# Patient Record
Sex: FEMALE | Race: WHITE | NOT HISPANIC OR LATINO | Employment: OTHER | URBAN - METROPOLITAN AREA
[De-identification: names, ages, dates, MRNs, and addresses within clinical notes are randomized per-mention and may not be internally consistent; named-entity substitution may affect disease eponyms.]

---

## 2024-05-13 ENCOUNTER — APPOINTMENT (OUTPATIENT)
Dept: LAB | Age: 64
End: 2024-05-13
Payer: COMMERCIAL

## 2024-05-13 ENCOUNTER — OFFICE VISIT (OUTPATIENT)
Dept: FAMILY MEDICINE CLINIC | Facility: CLINIC | Age: 64
End: 2024-05-13
Payer: COMMERCIAL

## 2024-05-13 ENCOUNTER — APPOINTMENT (OUTPATIENT)
Dept: RADIOLOGY | Age: 64
End: 2024-05-13
Payer: COMMERCIAL

## 2024-05-13 ENCOUNTER — TELEPHONE (OUTPATIENT)
Age: 64
End: 2024-05-13

## 2024-05-13 VITALS
SYSTOLIC BLOOD PRESSURE: 132 MMHG | DIASTOLIC BLOOD PRESSURE: 82 MMHG | TEMPERATURE: 98.2 F | HEART RATE: 62 BPM | RESPIRATION RATE: 14 BRPM | WEIGHT: 165 LBS | OXYGEN SATURATION: 98 %

## 2024-05-13 DIAGNOSIS — R73.09 ELEVATED HEMOGLOBIN A1C: ICD-10-CM

## 2024-05-13 DIAGNOSIS — Z12.31 SCREENING MAMMOGRAM FOR BREAST CANCER: ICD-10-CM

## 2024-05-13 DIAGNOSIS — R06.02 SOB (SHORTNESS OF BREATH): ICD-10-CM

## 2024-05-13 DIAGNOSIS — K21.9 GASTROESOPHAGEAL REFLUX DISEASE WITHOUT ESOPHAGITIS: ICD-10-CM

## 2024-05-13 DIAGNOSIS — E55.9 VITAMIN D DEFICIENCY: Primary | ICD-10-CM

## 2024-05-13 DIAGNOSIS — E78.49 OTHER HYPERLIPIDEMIA: ICD-10-CM

## 2024-05-13 DIAGNOSIS — R01.1 HEART MURMUR: ICD-10-CM

## 2024-05-13 DIAGNOSIS — R35.0 URINARY FREQUENCY: ICD-10-CM

## 2024-05-13 DIAGNOSIS — Z12.11 SCREEN FOR COLON CANCER: ICD-10-CM

## 2024-05-13 DIAGNOSIS — M54.16 LUMBAR RADICULOPATHY: ICD-10-CM

## 2024-05-13 DIAGNOSIS — I10 PRIMARY HYPERTENSION: Primary | ICD-10-CM

## 2024-05-13 DIAGNOSIS — E04.1 THYROID NODULE: ICD-10-CM

## 2024-05-13 DIAGNOSIS — I10 PRIMARY HYPERTENSION: ICD-10-CM

## 2024-05-13 DIAGNOSIS — F17.210 CIGARETTE SMOKER: ICD-10-CM

## 2024-05-13 LAB
25(OH)D3 SERPL-MCNC: 21.1 NG/ML (ref 30–100)
BASOPHILS # BLD AUTO: 0.07 THOUSANDS/ÂΜL (ref 0–0.1)
BASOPHILS NFR BLD AUTO: 1 % (ref 0–1)
BILIRUB UR QL STRIP: NEGATIVE
CHOLEST SERPL-MCNC: 143 MG/DL
CLARITY UR: CLEAR
COLOR UR: NORMAL
EOSINOPHIL # BLD AUTO: 0.16 THOUSAND/ÂΜL (ref 0–0.61)
EOSINOPHIL NFR BLD AUTO: 3 % (ref 0–6)
ERYTHROCYTE [DISTWIDTH] IN BLOOD BY AUTOMATED COUNT: 12.5 % (ref 11.6–15.1)
FERRITIN SERPL-MCNC: 45 NG/ML (ref 11–307)
GLUCOSE UR STRIP-MCNC: NEGATIVE MG/DL
HCT VFR BLD AUTO: 38.7 % (ref 34.8–46.1)
HDLC SERPL-MCNC: 50 MG/DL
HGB BLD-MCNC: 12.2 G/DL (ref 11.5–15.4)
HGB UR QL STRIP.AUTO: NEGATIVE
IMM GRANULOCYTES # BLD AUTO: 0.01 THOUSAND/UL (ref 0–0.2)
IMM GRANULOCYTES NFR BLD AUTO: 0 % (ref 0–2)
KETONES UR STRIP-MCNC: NEGATIVE MG/DL
LDLC SERPL CALC-MCNC: 71 MG/DL (ref 0–100)
LEUKOCYTE ESTERASE UR QL STRIP: NEGATIVE
LYMPHOCYTES # BLD AUTO: 2.35 THOUSANDS/ÂΜL (ref 0.6–4.47)
LYMPHOCYTES NFR BLD AUTO: 38 % (ref 14–44)
MAGNESIUM SERPL-MCNC: 2.1 MG/DL (ref 1.9–2.7)
MCH RBC QN AUTO: 30.8 PG (ref 26.8–34.3)
MCHC RBC AUTO-ENTMCNC: 31.5 G/DL (ref 31.4–37.4)
MCV RBC AUTO: 98 FL (ref 82–98)
MONOCYTES # BLD AUTO: 0.48 THOUSAND/ÂΜL (ref 0.17–1.22)
MONOCYTES NFR BLD AUTO: 8 % (ref 4–12)
NEUTROPHILS # BLD AUTO: 3.15 THOUSANDS/ÂΜL (ref 1.85–7.62)
NEUTS SEG NFR BLD AUTO: 50 % (ref 43–75)
NITRITE UR QL STRIP: NEGATIVE
NONHDLC SERPL-MCNC: 93 MG/DL
NRBC BLD AUTO-RTO: 0 /100 WBCS
PH UR STRIP.AUTO: 5.5 [PH]
PLATELET # BLD AUTO: 201 THOUSANDS/UL (ref 149–390)
PMV BLD AUTO: 11.8 FL (ref 8.9–12.7)
PROT UR STRIP-MCNC: NEGATIVE MG/DL
RBC # BLD AUTO: 3.96 MILLION/UL (ref 3.81–5.12)
SL AMB POCT HEMOGLOBIN AIC: 5.5 (ref ?–6.5)
SP GR UR STRIP.AUTO: 1.01 (ref 1–1.03)
T4 FREE SERPL-MCNC: 0.72 NG/DL (ref 0.61–1.12)
TRIGL SERPL-MCNC: 110 MG/DL
TSH SERPL DL<=0.05 MIU/L-ACNC: 2.38 UIU/ML (ref 0.45–4.5)
UROBILINOGEN UR STRIP-ACNC: <2 MG/DL
WBC # BLD AUTO: 6.22 THOUSAND/UL (ref 4.31–10.16)

## 2024-05-13 PROCEDURE — 72110 X-RAY EXAM L-2 SPINE 4/>VWS: CPT

## 2024-05-13 PROCEDURE — 81003 URINALYSIS AUTO W/O SCOPE: CPT

## 2024-05-13 PROCEDURE — 82306 VITAMIN D 25 HYDROXY: CPT

## 2024-05-13 PROCEDURE — 99204 OFFICE O/P NEW MOD 45 MIN: CPT | Performed by: INTERNAL MEDICINE

## 2024-05-13 PROCEDURE — 84439 ASSAY OF FREE THYROXINE: CPT

## 2024-05-13 PROCEDURE — 82728 ASSAY OF FERRITIN: CPT

## 2024-05-13 PROCEDURE — 85025 COMPLETE CBC W/AUTO DIFF WBC: CPT

## 2024-05-13 PROCEDURE — 84443 ASSAY THYROID STIM HORMONE: CPT

## 2024-05-13 PROCEDURE — 36415 COLL VENOUS BLD VENIPUNCTURE: CPT

## 2024-05-13 PROCEDURE — 93000 ELECTROCARDIOGRAM COMPLETE: CPT | Performed by: INTERNAL MEDICINE

## 2024-05-13 PROCEDURE — 83036 HEMOGLOBIN GLYCOSYLATED A1C: CPT | Performed by: INTERNAL MEDICINE

## 2024-05-13 PROCEDURE — 86800 THYROGLOBULIN ANTIBODY: CPT

## 2024-05-13 PROCEDURE — 83735 ASSAY OF MAGNESIUM: CPT

## 2024-05-13 PROCEDURE — 86376 MICROSOMAL ANTIBODY EACH: CPT

## 2024-05-13 PROCEDURE — 80061 LIPID PANEL: CPT

## 2024-05-13 RX ORDER — VALSARTAN 80 MG/1
80 TABLET ORAL DAILY
Qty: 90 TABLET | Refills: 3 | Status: SHIPPED | OUTPATIENT
Start: 2024-05-13

## 2024-05-13 RX ORDER — GABAPENTIN 100 MG/1
100 CAPSULE ORAL 2 TIMES DAILY
Qty: 60 CAPSULE | Refills: 1 | Status: SHIPPED | OUTPATIENT
Start: 2024-05-13

## 2024-05-13 RX ORDER — ERGOCALCIFEROL 1.25 MG/1
50000 CAPSULE ORAL WEEKLY
Qty: 12 CAPSULE | Refills: 2 | Status: SHIPPED | OUTPATIENT
Start: 2024-05-13

## 2024-05-13 RX ORDER — ROSUVASTATIN CALCIUM 5 MG/1
5 TABLET, COATED ORAL DAILY
Qty: 90 TABLET | Refills: 3 | Status: SHIPPED | OUTPATIENT
Start: 2024-05-13

## 2024-05-13 RX ORDER — BISOPROLOL FUMARATE 5 MG/1
5 TABLET, FILM COATED ORAL DAILY
Qty: 90 TABLET | Refills: 3 | Status: SHIPPED | OUTPATIENT
Start: 2024-05-13

## 2024-05-13 NOTE — TELEPHONE ENCOUNTER
Caller: Patient     Doctor/Office: N/A     Call regarding :  referral      Call was transferred to: [facility]

## 2024-05-13 NOTE — PROGRESS NOTES
Name: Young Quiñonez      : 1960      MRN: [id number]  Encounter Provider: Parveen Sosa MD  Encounter Date: 2024   Encounter department: [facility] CARE [facility]    Assessment & Plan     1. Primary hypertension  -     POCT hemoglobin A1c  -     Echo complete w/ contrast if indicated; Future; Expected date: 2024  -     Vitamin D 25 hydroxy; Future; Expected date: 2024  -     Magnesium; Future  -     CBC and differential; Future; Expected date: 2024  -     Comprehensive metabolic panel; Future; Expected date: 2024  -     Lipid panel; Future; Expected date: 2024  -     valsartan (DIOVAN) 80 mg tablet; Take 1 tablet (80 mg total) by mouth daily  -     bisoprolol (ZEBETA) 5 mg tablet; Take 1 tablet (5 mg total) by mouth daily    2. Other hyperlipidemia  -     POCT hemoglobin A1c  -     Echo complete w/ contrast if indicated; Future; Expected date: 2024  -     Vitamin D 25 hydroxy; Future; Expected date: 2024  -     Magnesium; Future  -     CBC and differential; Future; Expected date: 2024  -     Comprehensive metabolic panel; Future; Expected date: 2024  -     Lipid panel; Future; Expected date: 2024  -     rosuvastatin (CRESTOR) 5 mg tablet; Take 1 tablet (5 mg total) by mouth daily    3. Thyroid nodule  -     TSH, 3rd generation; Future; Expected date: 2024  -     T4, free; Future; Expected date: 2024  -     US thyroid; Future; Expected date: 2024  -     Thyroid Antibodies Panel; Future  -     Vitamin D 25 hydroxy; Future; Expected date: 2024  -     Magnesium; Future  -     CBC and differential; Future; Expected date: 2024  -     Comprehensive metabolic panel; Future; Expected date: 2024  -     Lipid panel; Future; Expected date: 2024    4. Gastroesophageal reflux disease without esophagitis  -     H. pylori antigen, stool; Future  -     Ferritin; Future  -     UA (URINE) with  reflex to Scope; Future  -     Vitamin D 25 hydroxy; Future; Expected date: 05/13/2024  -     Magnesium; Future  -     CBC and differential; Future; Expected date: 05/20/2024  -     Comprehensive metabolic panel; Future; Expected date: 11/13/2024  -     Lipid panel; Future; Expected date: 05/20/2024    5. Heart murmur  -     POCT ECG  -     Echo complete w/ contrast if indicated; Future; Expected date: 05/13/2024    6. Urinary frequency  Comments:  Most Likely due to ; Prolapse, Bladder and/or Uterus  consider; Gyn-Urology  Orders:  -     US kidney and bladder with pvr; Future; Expected date: 05/13/2024    7. Elevated hemoglobin A1c  -     POCT hemoglobin A1c    8. SOB (shortness of breath)    9. Cigarette smoker  -     CT lung screening program; Future; Expected date: 05/13/2024    10. Lumbar radiculopathy  -     XR spine lumbar minimum 4 views non injury; Future; Expected date: 05/13/2024  -     Ambulatory referral to Spine & Pain Management; Future  -     gabapentin (Neurontin) 100 mg capsule; Take 1 capsule (100 mg total) by mouth 2 (two) times a day    11. Screen for colon cancer  -     Cologuard    12. Screening mammogram for breast cancer  -     Mammo screening bilateral w 3d & cad; Future    Life style Mod  Rtc in 1 mo w Blood work       Subjective      63 Y O lady is here for First time in My office, complete H/P Discussed w Pt in Detail, she has few symptoms,...      Review of Systems   Constitutional:  Positive for fatigue. Negative for chills and fever.   HENT:  Positive for postnasal drip. Negative for congestion, facial swelling, sore throat, trouble swallowing and voice change.    Eyes:  Negative for pain, discharge and visual disturbance.   Respiratory:  Negative for cough, shortness of breath and wheezing.    Cardiovascular:  Negative for chest pain, palpitations and leg swelling.   Gastrointestinal:  Negative for abdominal pain, blood in stool, constipation, diarrhea and nausea.   Endocrine:  Negative for polydipsia, polyphagia and polyuria.   Genitourinary:  Positive for frequency and urgency. Negative for difficulty urinating and hematuria.   Musculoskeletal:  Positive for back pain. Negative for arthralgias and myalgias.   Skin:  Negative for rash.   Neurological:  Positive for numbness. Negative for dizziness, tremors, weakness and headaches.   Hematological:  Negative for adenopathy. Does not bruise/bleed easily.   Psychiatric/Behavioral:  Negative for dysphoric mood, sleep disturbance and suicidal ideas.        No current outpatient medications on file prior to visit.       Objective     /82 (BP Location: Left arm, Patient Position: Sitting, Cuff Size: Standard)   Pulse 62   Temp 98.2 °F (36.8 °C) (Tympanic)   Resp 14   Wt 74.8 kg (165 lb)   SpO2 98%     Physical Exam  Constitutional:       General: She is not in acute distress.  HENT:      Head: Normocephalic.      Mouth/Throat:      Pharynx: No oropharyngeal exudate.   Eyes:      General: No scleral icterus.     Conjunctiva/sclera: Conjunctivae normal.      Pupils: Pupils are equal, round, and reactive to light.   Neck:      Thyroid: No thyromegaly.   Cardiovascular:      Rate and Rhythm: Normal rate and regular rhythm.      Heart sounds: Murmur heard.   Pulmonary:      Effort: Pulmonary effort is normal. No respiratory distress.      Breath sounds: Normal breath sounds. No wheezing or rales.   Abdominal:      General: Bowel sounds are normal. There is no distension.      Palpations: Abdomen is soft.      Tenderness: There is no abdominal tenderness. There is no guarding or rebound.   Musculoskeletal:         General: Tenderness present.      Cervical back: Neck supple.   Lymphadenopathy:      Cervical: No cervical adenopathy.   Skin:     Coloration: Skin is not pale.      Findings: No rash.   Neurological:      Mental Status: She is alert and oriented to person, place, and time.      Sensory: Sensory deficit present.      Motor: No  weakness.       Parveen Sosa MD

## 2024-05-14 LAB
THYROGLOB AB SERPL-ACNC: <1 IU/ML (ref 0–0.9)
THYROPEROXIDASE AB SERPL-ACNC: <9 IU/ML (ref 0–34)

## 2024-05-15 ENCOUNTER — TELEPHONE (OUTPATIENT)
Dept: FAMILY MEDICINE CLINIC | Facility: CLINIC | Age: 64
End: 2024-05-15

## 2024-05-15 ENCOUNTER — HOSPITAL ENCOUNTER (OUTPATIENT)
Dept: MAMMOGRAPHY | Facility: CLINIC | Age: 64
Discharge: HOME | End: 2024-05-15
Payer: COMMERCIAL

## 2024-05-15 VITALS — BODY MASS INDEX: 33.38 KG/M2 | WEIGHT: 170 LBS | HEIGHT: 60 IN

## 2024-05-15 DIAGNOSIS — Z12.31 SCREENING MAMMOGRAM FOR BREAST CANCER: ICD-10-CM

## 2024-05-15 PROCEDURE — 77063 BREAST TOMOSYNTHESIS BI: CPT

## 2024-05-15 PROCEDURE — 77067 SCR MAMMO BI INCL CAD: CPT

## 2024-05-15 NOTE — TELEPHONE ENCOUNTER
Called and spoke with pts daughter she noted that she did schedule an apt with dr bartholomew for 5/21.

## 2024-05-20 ENCOUNTER — APPOINTMENT (OUTPATIENT)
Dept: CT IMAGING | Facility: HOSPITAL | Age: 64
End: 2024-05-20
Payer: COMMERCIAL

## 2024-05-20 ENCOUNTER — HOSPITAL ENCOUNTER (OUTPATIENT)
Dept: ULTRASOUND IMAGING | Facility: HOSPITAL | Age: 64
Discharge: HOME | End: 2024-05-20
Payer: COMMERCIAL

## 2024-05-20 DIAGNOSIS — R35.0 URINARY FREQUENCY: ICD-10-CM

## 2024-05-20 DIAGNOSIS — E04.1 THYROID NODULE: ICD-10-CM

## 2024-05-20 PROCEDURE — 76770 US EXAM ABDO BACK WALL COMP: CPT

## 2024-05-20 PROCEDURE — 76536 US EXAM OF HEAD AND NECK: CPT

## 2024-05-21 ENCOUNTER — CONSULT (OUTPATIENT)
Dept: PAIN MEDICINE | Facility: CLINIC | Age: 64
End: 2024-05-21
Payer: COMMERCIAL

## 2024-05-21 VITALS
WEIGHT: 170 LBS | HEIGHT: 60 IN | SYSTOLIC BLOOD PRESSURE: 132 MMHG | BODY MASS INDEX: 33.38 KG/M2 | DIASTOLIC BLOOD PRESSURE: 80 MMHG

## 2024-05-21 DIAGNOSIS — M54.16 LUMBAR RADICULOPATHY: Primary | ICD-10-CM

## 2024-05-21 DIAGNOSIS — M51.26 DISPLACEMENT OF LUMBAR INTERVERTEBRAL DISC: ICD-10-CM

## 2024-05-21 DIAGNOSIS — M48.061 SPINAL STENOSIS OF LUMBAR REGION, UNSPECIFIED WHETHER NEUROGENIC CLAUDICATION PRESENT: ICD-10-CM

## 2024-05-21 PROCEDURE — 99244 OFF/OP CNSLTJ NEW/EST MOD 40: CPT | Performed by: ANESTHESIOLOGY

## 2024-05-21 NOTE — PROGRESS NOTES
Assessment  1. Lumbar radiculopathy    2. Displacement of lumbar intervertebral disc      Patient presents with daughter today who provided Latvian interpretation as well as acting as an independent historian.    Patient presenting with chronic back with right radicular leg pain for greater than 1 year, worsening over the past several months.  Pain is consistent with lumbar radicular pain, lumbar spinal stenosis accompanied by pain 7/10 on the pain scale with inability to participate in IADLs for >6 weeks.     Patient has participated with chiropractic therapy in [place] country. She states she is fearful of trying chiropractic or physical therapy again as it worsened her symptoms.     Patient has tried gabapentin with modest benefit.    Denies any bowel or bladder incontinence, saddle anesthesia.    In regards to the patient's pathology, we discussed the various treatment options including physical therapy, chiropractic treatment, medication management, activity modifications, interventional spine procedures.  Given that patient has not had any benefit with conservative treatments, I think patient would benefit from targeted interventional treatment modalities.    Independently reviewed and interpreted external lumbar MRI on CD that patient brought. This showed mild to moderate spinal stenosis from L3-S1 with vary degrees of foraminal stenosis.    Plan    Discussed lumbar epidural steroid injection. Would plan for right L4-5 LESI.  Risks, benefits, and alternatives to epidural steroid injections thoroughly discussed with patient.   Complete risks and benefits including bleeding, infection, tissue reaction, nerve injury and allergic reaction were discussed. The approach was demonstrated using models and literature was provided.     Patient will be contacted to schedule LESI as the next treatment modality.    Reviewed external notes from family medicine (5/13/24) in regards to recent and prior relevant medical  histories, treatment recommendations, medication and/or interventional treatment responses.    Reviewed hemoglobin A1c, renal function, CBC and/or PT/INR prior to discussing/offering interventional modalities.    [place] Prescription Drug Monitoring Program report was reviewed and was appropriate     My impressions and treatment recommendations were discussed in detail with the patient who verbalized understanding and had no further questions.  Discharge instructions were provided. I personally saw and examined the patient and I agree with the above discussed plan of care.    Orders Placed This Encounter   Procedures    MRI lumbar spine wo contrast     Standing Status:   Future     Standing Expiration Date:   5/21/2028     Scheduling Instructions:      There is no preparation for this test. Please leave your jewelry and valuables at home, wedding rings are the exception. All patients will be required to change into a hospital gown and pants.  Street clothes are not permitted in the MRI.  Magnetic nail polish must be removed prior to arrival for your test. Please bring your insurance cards, a form of photo ID and a list of your medications with you. Arrive 15 minutes prior to your appointment time in order to register. Please bring any prior CT or MRI studies of this area that were not performed at a [facility].            To schedule this appointment, please contact Central Scheduling at [phone].            Prior to your appointment, please make sure you complete the MRI Screening Form when you e-Check in for your appointment. This will be available starting 7 days before your appointment in [employer]. You may receive an e-mail with an activation code if you do not have a [employer] account. If you do not have access to a device, we will complete your screening at your appointment.     Order Specific Question:   What is the patient's sedation requirement?     Answer:   No Sedation     Order Specific  Question:   Does this procedure require the 3T MRI at [facility]?     Answer:   No     Order Specific Question:   Release to patient through [facility]     Answer:   Immediate     Order Specific Question:   Is order priority selected as STAT?     Answer:   No     Order Specific Question:   Reason for Exam     Answer:   right lumbar radiculopathy    FL spine and pain procedure     Standing Status:   Future     Standing Expiration Date:   5/21/2028     Order Specific Question:   Reason for Exam:     Answer:   right L4-5 LESI     Order Specific Question:   Anticoagulant hold needed?     Answer:   no     No orders of the defined types were placed in this encounter.      History of Present Illness    Young Quiñonez is a 63 y.o. female presenting for consultation (referred by Dr. Sosa) at [facility] for exam and evaluation of chronic right radicular back and leg pain for greater than 1 year, worsening over the past several months. Pain started without any precipitating injury or trauma. Over the past month, the intensity of pain has been Moderate to severe. Pain is currently 7/10. Pain does interfere with age appropriate activities of daily living. Pain is intermittent, with no typical pattern throughout the day. Pain is described as cramping, sharp with numbness and pins-and-needles. Patient denies weakness in the lower extremities. Assistance device used: None.    Pain is increased with standing.   Pain is decreased with lying down.    Treatments tried:   PT: no  Chiropractic therapy: yes  Injections: no   Previous spine surgery: No    Anticoagulation: no    Medications tried:   gabapentin    I have personally reviewed and/or updated the patient's past medical history, past surgical history, family history, social history, current medications, allergies, and vital signs today.     Review of Systems    There is no problem list on file for this patient.      History reviewed. No pertinent past  "medical history.    History reviewed. No pertinent surgical history.    Family History   Problem Relation Age of Onset    Breast cancer Neg Hx        Social History     Occupational History    Not on file   Tobacco Use    Smoking status: Former     Types: Cigarettes    Smokeless tobacco: Never   Substance and Sexual Activity    Alcohol use: Not Currently    Drug use: Never    Sexual activity: Not Currently       Current Outpatient Medications on File Prior to Visit   Medication Sig    bisoprolol (ZEBETA) 5 mg tablet Take 1 tablet (5 mg total) by mouth daily    ergocalciferol (VITAMIN D2) 50,000 units Take 1 capsule (50,000 Units total) by mouth once a week    gabapentin (Neurontin) 100 mg capsule Take 1 capsule (100 mg total) by mouth 2 (two) times a day    rosuvastatin (CRESTOR) 5 mg tablet Take 1 tablet (5 mg total) by mouth daily    valsartan (DIOVAN) 80 mg tablet Take 1 tablet (80 mg total) by mouth daily     No current facility-administered medications on file prior to visit.       No Known Allergies    Physical Exam    /80   Ht 4' 11.84\" (1.52 m)   Wt 77.1 kg (170 lb)   BMI 33.38 kg/m²     Constitutional: normal, well developed, well nourished, alert, in no distress and non-toxic and no overt pain behavior.  Eyes: anicteric  HEENT: grossly intact  Neck: supple, symmetric, trachea midline and no masses   Pulmonary:even and unlabored  Cardiovascular:No edema or pitting edema present  Skin:Normal without rashes or lesions and well hydrated  Psychiatric:Mood and affect appropriate  Neurologic: Motor function is grossly intact with no focal neurologic deficits   Musculoskeletal: Gait is limited secondary to RLE pain when weight bearing. Limited lumbar spine ROM    Imaging.  XR lumbar spine  FINDINGS:     There are 5 non rib bearing lumbar vertebral bodies.      There is no evidence of acute fracture or destructive osseous lesion.     Rightward thoracolumbar curvature.     Multilevel degenerative facet " hypertrophy and disc space narrowing in the lumbar spine with changes most prominent at L3-L4.     Lumbar vertebral body heights are maintained.     The pedicles appear intact.     Soft tissues are unremarkable.     IMPRESSION:        Rightward thoracolumbar curvature.     Multilevel degenerative facet hypertrophy and disc space narrowing in the lumbar spine with changes most prominent at L3-L4.

## 2024-05-22 ENCOUNTER — TELEPHONE (OUTPATIENT)
Age: 64
End: 2024-05-22

## 2024-05-22 NOTE — TELEPHONE ENCOUNTER
Caller: Chapo (Daughter)     Doctor:      Reason for call: Patient was told at appointment yesterday the the MRI from her country should be ok but there is another MRI that was now ordered and scheduled.    They are looking for clarification as to why this second MRI is being done .    Please advise     Call back#: [phone]

## 2024-05-22 NOTE — TELEPHONE ENCOUNTER
RN s/w Chapo who stated that she would cx the MRI after she speaks to her , RN reiterated that she may cx it because Dr Hamlin stated we do not need another one.    Per Chapo she is waiting to hear from  to schedule procedure for her mother.        --please call Chapo pt's daughter to schedule procedure thank you

## 2024-05-22 NOTE — TELEPHONE ENCOUNTER
Left message for patient to call me back DIRECTLY to schedule.  Left my DIRECT phone # 2x on message.

## 2024-05-28 ENCOUNTER — HOSPITAL ENCOUNTER (OUTPATIENT)
Dept: CT IMAGING | Facility: HOSPITAL | Age: 64
Discharge: HOME | End: 2024-05-28
Payer: COMMERCIAL

## 2024-05-28 DIAGNOSIS — F17.210 CIGARETTE SMOKER: ICD-10-CM

## 2024-05-28 PROCEDURE — 71271 CT THORAX LUNG CANCER SCR C-: CPT

## 2024-05-29 ENCOUNTER — HOSPITAL ENCOUNTER (OUTPATIENT)
Dept: MRI IMAGING | Facility: HOSPITAL | Age: 64
Discharge: HOME | End: 2024-05-29
Attending: ANESTHESIOLOGY

## 2024-05-29 DIAGNOSIS — M54.16 LUMBAR RADICULOPATHY: ICD-10-CM

## 2024-06-01 LAB — COLOGUARD RESULT REPORTABLE: NEGATIVE

## 2024-06-03 ENCOUNTER — TELEPHONE (OUTPATIENT)
Dept: FAMILY MEDICINE CLINIC | Facility: CLINIC | Age: 64
End: 2024-06-03

## 2024-06-03 NOTE — TELEPHONE ENCOUNTER
Received a call from Evie @ [facility].  There is a significant finding on the patient's CT of Lungs.  Please review and advise.

## 2024-06-05 DIAGNOSIS — R91.8 ABNORMAL CT LUNG SCREENING: Primary | ICD-10-CM

## 2024-06-06 ENCOUNTER — TELEPHONE (OUTPATIENT)
Age: 64
End: 2024-06-06

## 2024-06-06 ENCOUNTER — TELEPHONE (OUTPATIENT)
Dept: FAMILY MEDICINE CLINIC | Facility: CLINIC | Age: 64
End: 2024-06-06

## 2024-06-06 NOTE — TELEPHONE ENCOUNTER
Spoke with the patient's daughter.  Gave her all info to schedule appointments for PFT's and Pulmonary.

## 2024-06-06 NOTE — TELEPHONE ENCOUNTER
----- Message from Parveen Sosa MD sent at 6/5/2024  7:16 PM EDT -----  Pulmonary consult and PFTs....  ----- Message -----  From: Interface, Radiology Results In  Sent: 6/3/2024  10:36 AM EDT  To: Parveen Sosa MD

## 2024-06-06 NOTE — TELEPHONE ENCOUNTER
Spoke with the patient's daughter.  She was driving she will call back to [place] with me, she was driving.

## 2024-06-06 NOTE — TELEPHONE ENCOUNTER
Patient's daughter called to speak with Suellen in the office. Daughter said that Suellen told her to ask for her specifically.  Call transferred to Suellen in the office.

## 2024-06-07 ENCOUNTER — TELEPHONE (OUTPATIENT)
Dept: RADIOLOGY | Facility: MEDICAL CENTER | Age: 64
End: 2024-06-07

## 2024-06-07 NOTE — TELEPHONE ENCOUNTER
Spoke with the patients daughter explained to her that Dr Hamlin first for his procedures were 6/27, the patient was unable to schedule that date at this time.. she asked if she could do a ALF I stated that was ok if that was something she wanted. I then explained the process for a ALF. The patient then said she wanted to stay with Dr Hamlin and to please call her if there were any cancellations.

## 2024-06-10 NOTE — TELEPHONE ENCOUNTER
Caller: Young Daughter     Doctor: Yakelin     Reason for call: Patient daughter asking to speak to  to schedule procedure please advise     Call back#: [phone]

## 2024-06-10 NOTE — TELEPHONE ENCOUNTER
Spoke with the patient's daughter, she was looking to see if their were any cancellations. I made her aware that at this time there were none but if anything came up I would give her a call.

## 2024-06-11 ENCOUNTER — HOSPITAL ENCOUNTER (OUTPATIENT)
Dept: NON INVASIVE DIAGNOSTICS | Facility: HOSPITAL | Age: 64
Discharge: HOME | End: 2024-06-11
Payer: COMMERCIAL

## 2024-06-11 VITALS
SYSTOLIC BLOOD PRESSURE: 132 MMHG | BODY MASS INDEX: 34.27 KG/M2 | DIASTOLIC BLOOD PRESSURE: 80 MMHG | HEIGHT: 59 IN | HEART RATE: 60 BPM | WEIGHT: 170 LBS

## 2024-06-11 DIAGNOSIS — E78.49 OTHER HYPERLIPIDEMIA: ICD-10-CM

## 2024-06-11 DIAGNOSIS — R01.1 HEART MURMUR: ICD-10-CM

## 2024-06-11 DIAGNOSIS — I10 PRIMARY HYPERTENSION: ICD-10-CM

## 2024-06-11 LAB
AORTIC ROOT: 2.9 CM
APICAL FOUR CHAMBER EJECTION FRACTION: 61 %
BSA FOR ECHO PROCEDURE: 1.72 M2
E WAVE DECELERATION TIME: 192 MS
E/A RATIO: 1.09
FRACTIONAL SHORTENING: 36 (ref 28–44)
INTERVENTRICULAR SEPTUM IN DIASTOLE (PARASTERNAL SHORT AXIS VIEW): 1 CM
INTERVENTRICULAR SEPTUM: 1 CM (ref 0.6–1.1)
LAAS-AP2: 19.5 CM2
LAAS-AP4: 20 CM2
LEFT ATRIUM SIZE: 4.1 CM
LEFT ATRIUM VOLUME (MOD BIPLANE): 61 ML
LEFT ATRIUM VOLUME INDEX (MOD BIPLANE): 35.1 ML/M2
LEFT INTERNAL DIMENSION IN SYSTOLE: 2.7 CM (ref 2.1–4)
LEFT VENTRICULAR INTERNAL DIMENSION IN DIASTOLE: 4.2 CM (ref 3.5–6)
LEFT VENTRICULAR POSTERIOR WALL IN END DIASTOLE: 1 CM
LEFT VENTRICULAR STROKE VOLUME: 51 ML
LVSV (TEICH): 51 ML
MV E'TISSUE VEL-SEP: 7 CM/S
MV PEAK A VEL: 0.68 M/S
MV PEAK E VEL: 74 CM/S
MV STENOSIS PRESSURE HALF TIME: 56 MS
MV VALVE AREA P 1/2 METHOD: 3.93
RA PRESSURE ESTIMATED: 3 MMHG
RIGHT ATRIUM AREA SYSTOLE A4C: 16.3 CM2
RIGHT VENTRICLE ID DIMENSION: 3.4 CM
RV PSP: 20 MMHG
SL CV LEFT ATRIUM LENGTH A2C: 5.3 CM
SL CV LV EF: 61
SL CV PED ECHO LEFT VENTRICLE DIASTOLIC VOLUME (MOD BIPLANE) 2D: 79 ML
SL CV PED ECHO LEFT VENTRICLE SYSTOLIC VOLUME (MOD BIPLANE) 2D: 28 ML
TR MAX PG: 17 MMHG
TR PEAK VELOCITY: 2.1 M/S
TRICUSPID ANNULAR PLANE SYSTOLIC EXCURSION: 2.2 CM
TRICUSPID VALVE PEAK REGURGITATION VELOCITY: 2.05 M/S

## 2024-06-11 PROCEDURE — 93306 TTE W/DOPPLER COMPLETE: CPT

## 2024-06-11 PROCEDURE — 93306 TTE W/DOPPLER COMPLETE: CPT | Performed by: INTERNAL MEDICINE

## 2024-06-13 ENCOUNTER — HOSPITAL ENCOUNTER (OUTPATIENT)
Dept: RADIOLOGY | Facility: MEDICAL CENTER | Age: 64
Discharge: HOME | End: 2024-06-13
Payer: COMMERCIAL

## 2024-06-13 ENCOUNTER — TELEPHONE (OUTPATIENT)
Age: 64
End: 2024-06-13

## 2024-06-13 VITALS
TEMPERATURE: 97.8 F | SYSTOLIC BLOOD PRESSURE: 108 MMHG | RESPIRATION RATE: 16 BRPM | HEART RATE: 79 BPM | OXYGEN SATURATION: 96 % | DIASTOLIC BLOOD PRESSURE: 73 MMHG

## 2024-06-13 DIAGNOSIS — M54.16 LUMBAR RADICULOPATHY: ICD-10-CM

## 2024-06-13 DIAGNOSIS — K59.04 CHRONIC IDIOPATHIC CONSTIPATION: Primary | ICD-10-CM

## 2024-06-13 DIAGNOSIS — Z12.11 SCREEN FOR COLON CANCER: ICD-10-CM

## 2024-06-13 PROCEDURE — 62323 NJX INTERLAMINAR LMBR/SAC: CPT | Performed by: ANESTHESIOLOGY

## 2024-06-13 RX ORDER — METHYLPREDNISOLONE ACETATE 80 MG/ML
80 INJECTION, SUSPENSION INTRA-ARTICULAR; INTRALESIONAL; INTRAMUSCULAR; PARENTERAL; SOFT TISSUE ONCE
Status: COMPLETED | OUTPATIENT
Start: 2024-06-13 | End: 2024-06-13

## 2024-06-13 RX ORDER — BUPIVACAINE HCL/PF 2.5 MG/ML
1 VIAL (ML) INJECTION ONCE
Status: COMPLETED | OUTPATIENT
Start: 2024-06-13 | End: 2024-06-13

## 2024-06-13 RX ADMIN — IOHEXOL 1 ML: 300 INJECTION, SOLUTION INTRAVENOUS at 13:15

## 2024-06-13 RX ADMIN — METHYLPREDNISOLONE ACETATE 80 MG: 80 INJECTION, SUSPENSION INTRA-ARTICULAR; INTRALESIONAL; INTRAMUSCULAR; PARENTERAL; SOFT TISSUE at 13:15

## 2024-06-13 RX ADMIN — BUPIVACAINE HYDROCHLORIDE 1 ML: 2.5 INJECTION, SOLUTION EPIDURAL; INFILTRATION; INTRACAUDAL at 13:15

## 2024-06-13 NOTE — DISCHARGE INSTRUCTIONS
Epidural Steroid Injection   WHAT YOU NEED TO KNOW:   An epidural steroid injection (PAT) is a procedure to inject steroid medicine into the epidural space. The epidural space is between your spinal cord and vertebrae. Steroids reduce inflammation and fluid buildup in your spine that may be causing pain. You may be given pain medicine along with the steroids.          ACTIVITY  Do not drive or operate machinery today.  No strenuous activity today - bending, lifting, etc.  You may resume normal activites starting tomorrow - start slowly and as tolerated.  You may shower today, but no tub baths or hot tubs.  You may have numbness for several hours from the local anesthetic. Please use caution and common sense, especially with weight-bearing activities.    CARE OF THE INJECTION SITE  If you have soreness or pain, apply ice to the area today (20 minutes on/20 minutes off).  Starting tomorrow, you may use warm, moist heat or ice if needed.  You may have an increase or change in your discomfort for 36-48 hours after your treatment.  Apply ice and continue with any pain medication you have been prescribed.  Notify the [facility] if you have any of the following: redness, drainage, swelling, headache, stiff neck or fever above 100°F.    SPECIAL INSTRUCTIONS  Our office will contact you in approximately 14 days for a progress report.    MEDICATIONS  Continue to take all routine medications.  Our office may have instructed you to hold some medications.    As no general anesthesia was used in today's procedure, you should not experience any side effects related to anesthesia.     If you are diabetic, the steroids used in today's injection may temporarily increase your blood sugar levels after the first few days after your injection. Please keep a close eye on your sugars and alert the doctor who manages your diabetes if your sugars are significantly high from your baseline or you are symptomatic.     If you have a  problem specifically related to your procedure, please call our office at [phone].  Problems not related to your procedure should be directed to your primary care physician.

## 2024-06-13 NOTE — H&P
History of Present Illness: The patient is a 63 y.o. female who presents with complaints of back and leg pain    No past medical history on file.    No past surgical history on file.      Current Outpatient Medications:     bisoprolol (ZEBETA) 5 mg tablet, Take 1 tablet (5 mg total) by mouth daily, Disp: 90 tablet, Rfl: 3    ergocalciferol (VITAMIN D2) 50,000 units, Take 1 capsule (50,000 Units total) by mouth once a week, Disp: 12 capsule, Rfl: 2    gabapentin (Neurontin) 100 mg capsule, Take 1 capsule (100 mg total) by mouth 2 (two) times a day, Disp: 60 capsule, Rfl: 1    rosuvastatin (CRESTOR) 5 mg tablet, Take 1 tablet (5 mg total) by mouth daily, Disp: 90 tablet, Rfl: 3    valsartan (DIOVAN) 80 mg tablet, Take 1 tablet (80 mg total) by mouth daily, Disp: 90 tablet, Rfl: 3    Current Facility-Administered Medications:     bupivacaine (PF) (MARCAINE) 0.25 % injection 1 mL, 1 mL, Epidural, Once, Will Berry Hamlin MD    iohexol (OMNIPAQUE) 300 mg/mL injection 1 mL, 1 mL, Epidural, Once, Will Berry Hamlin MD    methylPREDNISolone acetate (DEPO-MEDROL) injection 80 mg, 80 mg, Epidural, Once, Will Berry Hamlin MD    No Known Allergies    Physical Exam:   Vitals:    06/13/24 1302   BP: 99/67   Pulse: 79   Resp: 18   Temp: 97.8 °F (36.6 °C)   SpO2: 98%     General: Awake, Alert, Oriented x 3, Mood and affect appropriate  Respiratory: Respirations even and unlabored  Cardiovascular: Peripheral pulses intact; no edema  Musculoskeletal Exam: back and leg pain    ASA Score: 1    Patient/Chart Verification  Patient ID Verified: Verbal  ID Band Applied: No  Consents Confirmed: Procedural  H&P( within 30 days) Verified: To be obtained in the Pre-Procedure area  Interval H&P(within 24 hr) Complete (required for Outpatients and Surgery Admit only): To be obtained in the Pre-Procedure area  Allergies Reviewed: Yes  Anticoag/NSAID held?: No  Currently on antibiotics?: No  Pre-op Lab/Test Results Available: In chart  Pregnancy Lab  Collected: N/A comment    Assessment:   1. Lumbar radiculopathy        Plan: right L4-5 LESI

## 2024-06-17 NOTE — TELEPHONE ENCOUNTER
PA for  [address]     Submitted via      [x]CMM-KEY [id number]  []Surescripts-Case ID #   [x]Faxed to plan   []Other website   []Phone call Case ID #     Office notes sent, clinical questions answered. Awaiting determination    Turnaround time for your insurance to make a decision on your Prior Authorization can take 7-21 business days.

## 2024-06-19 ENCOUNTER — APPOINTMENT (OUTPATIENT)
Dept: LAB | Facility: CLINIC | Age: 64
End: 2024-06-19
Payer: COMMERCIAL

## 2024-06-19 ENCOUNTER — CONSULT (OUTPATIENT)
Dept: PULMONOLOGY | Facility: CLINIC | Age: 64
End: 2024-06-19
Payer: COMMERCIAL

## 2024-06-19 VITALS
HEART RATE: 87 BPM | OXYGEN SATURATION: 96 % | TEMPERATURE: 99.3 F | BODY MASS INDEX: 33.47 KG/M2 | SYSTOLIC BLOOD PRESSURE: 114 MMHG | WEIGHT: 166 LBS | DIASTOLIC BLOOD PRESSURE: 62 MMHG | HEIGHT: 59 IN

## 2024-06-19 DIAGNOSIS — R91.8 ABNORMAL CT LUNG SCREENING: ICD-10-CM

## 2024-06-19 DIAGNOSIS — K59.04 CHRONIC IDIOPATHIC CONSTIPATION: Primary | ICD-10-CM

## 2024-06-19 DIAGNOSIS — J84.9 INTERSTITIAL LUNG DISEASE (HCC): Primary | ICD-10-CM

## 2024-06-19 DIAGNOSIS — J84.9 INTERSTITIAL LUNG DISEASE (HCC): ICD-10-CM

## 2024-06-19 LAB
ALBUMIN SERPL BCG-MCNC: 4.1 G/DL (ref 3.5–5)
ALP SERPL-CCNC: 51 U/L (ref 34–104)
ALT SERPL W P-5'-P-CCNC: 13 U/L (ref 7–52)
ANION GAP SERPL CALCULATED.3IONS-SCNC: 10 MMOL/L (ref 4–13)
AST SERPL W P-5'-P-CCNC: 19 U/L (ref 13–39)
BILIRUB SERPL-MCNC: 0.35 MG/DL (ref 0.2–1)
BUN SERPL-MCNC: 23 MG/DL (ref 5–25)
CALCIUM SERPL-MCNC: 9.4 MG/DL (ref 8.4–10.2)
CHLORIDE SERPL-SCNC: 104 MMOL/L (ref 96–108)
CK SERPL-CCNC: 111 U/L (ref 26–192)
CO2 SERPL-SCNC: 26 MMOL/L (ref 21–32)
CREAT SERPL-MCNC: 0.47 MG/DL (ref 0.6–1.3)
CRP SERPL QL: <1 MG/L
ERYTHROCYTE [SEDIMENTATION RATE] IN BLOOD: 44 MM/HOUR (ref 0–29)
GFR SERPL CREATININE-BSD FRML MDRD: 106 ML/MIN/1.73SQ M
GLUCOSE SERPL-MCNC: 112 MG/DL (ref 65–140)
POTASSIUM SERPL-SCNC: 3.6 MMOL/L (ref 3.5–5.3)
PROT SERPL-MCNC: 7.1 G/DL (ref 6.4–8.4)
SODIUM SERPL-SCNC: 140 MMOL/L (ref 135–147)

## 2024-06-19 PROCEDURE — 86200 CCP ANTIBODY: CPT | Performed by: INTERNAL MEDICINE

## 2024-06-19 PROCEDURE — 86140 C-REACTIVE PROTEIN: CPT

## 2024-06-19 PROCEDURE — 82085 ASSAY OF ALDOLASE: CPT

## 2024-06-19 PROCEDURE — 80053 COMPREHEN METABOLIC PANEL: CPT

## 2024-06-19 PROCEDURE — 82550 ASSAY OF CK (CPK): CPT

## 2024-06-19 PROCEDURE — 36415 COLL VENOUS BLD VENIPUNCTURE: CPT

## 2024-06-19 PROCEDURE — 83516 IMMUNOASSAY NONANTIBODY: CPT | Performed by: INTERNAL MEDICINE

## 2024-06-19 PROCEDURE — 86431 RHEUMATOID FACTOR QUANT: CPT | Performed by: INTERNAL MEDICINE

## 2024-06-19 PROCEDURE — 86235 NUCLEAR ANTIGEN ANTIBODY: CPT

## 2024-06-19 PROCEDURE — 99244 OFF/OP CNSLTJ NEW/EST MOD 40: CPT | Performed by: INTERNAL MEDICINE

## 2024-06-19 PROCEDURE — 85652 RBC SED RATE AUTOMATED: CPT

## 2024-06-19 PROCEDURE — 83520 IMMUNOASSAY QUANT NOS NONAB: CPT | Performed by: INTERNAL MEDICINE

## 2024-06-19 PROCEDURE — 84182 PROTEIN WESTERN BLOT TEST: CPT | Performed by: INTERNAL MEDICINE

## 2024-06-19 NOTE — ASSESSMENT & PLAN NOTE
She has some mild to moderate reticular changes with mild traction bronchiectasis predominantly in the lower lobes.  She is a smoker for 10 years.  Otherwise no occupational, environmental, medications in her history that would explain these findings.  She does not exhibit any other signs of autoimmune disease.    There are no prior imaging to compare to.  This is associated with dyspnea on exertion and dyspnea when lying on her left side.  She has bibasilar dry crackles on exam.  No clubbing    There is no significant family history of interstitial lung disease.    -Will send her for serologies, CKs, aldolase, ESR/CRP, CMP.  No significant peripheral eosinophilia on recent CBC with differential.  -Pulmonary function testing ordered by PCP, we will help schedule  -Unclear etiology at this time, follow-up in 2 months for further workup  -I will try to schedule her with one of our Slovak speaking physicians for follow-up

## 2024-06-19 NOTE — TELEPHONE ENCOUNTER
Lizess was denied after prior auth was completed. Do you want to order anything else? Please advise

## 2024-06-19 NOTE — PROGRESS NOTES
Pulmonary Outpatient Note   Young Quiñonez 63 y.o. female MRN: [id number]  6/19/2024      Referring Physician: Parveen Sosa MD    Reason for Consultation:    Chief Complaint   Patient presents with    Interstitial Lung Disease     Assessment/Plan:    1. Interstitial lung disease (HCC)  Assessment & Plan:  She has some mild to moderate reticular changes with mild traction bronchiectasis predominantly in the lower lobes.  She is a smoker for 10 years.  Otherwise no occupational, environmental, medications in her history that would explain these findings.  She does not exhibit any other signs of autoimmune disease.    There are no prior imaging to compare to.  This is associated with dyspnea on exertion and dyspnea when lying on her left side.  She has bibasilar dry crackles on exam.  No clubbing    There is no significant family history of interstitial lung disease.    -Will send her for serologies, CKs, aldolase, ESR/CRP, CMP.  No significant peripheral eosinophilia on recent CBC with differential.  -Pulmonary function testing ordered by PCP, we will help schedule  -Unclear etiology at this time, follow-up in 2 months for further workup  -I will try to schedule her with one of our Amharic speaking physicians for follow-up  Orders:  -     INTERSTITIAL LUNG DISEASE PANEL; Future  -     Comprehensive metabolic panel; Future  -     C-reactive protein; Future  -     CK; Future  -     Aldolase; Future  -     Sedimentation rate, automated; Future  2. Abnormal CT lung screening  -     Ambulatory Referral to Pulmonology    Health Maintenance    There is no immunization history on file for this patient.     Return in about 2 months (around 8/19/2024).    History of Present Illness   HPI:  Young Quiñonez is a pleasant 63 y.o. female with a history of hypertension, hyperlipidemia, tobacco use in remission, who is presenting for evaluation of abnormal CT chest, interstitial lung disease    I used an official  via  [employer] for this interview.  Her daughter also assisted in translation.    She is from [place] and immigrated to the [place] 3 months ago.  She has a 10-pack-year smoker and quit 3 months ago.  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  She was not exposed to any bombings or warzone while living in [place].    She reports decades of dyspnea on exertion especially with heavy exertion, such as climbing stairs and lifting heavy objects.  The symptoms have worsened over the past few years.  She has weight gain but no significant abnormal weight loss.  She denies any cough.  When she was in her 20s she did have 1 year of dry cough of unclear etiology that resolved spontaneously.  She has no pleuritic chest pain.  She has no fever/chills/night sweats.  She reports more difficulty breathing when lying on her left side.  She feels like she cannot take a deep enough breath    She was evaluated by her PCP.  Her PCP heard pulmonary crackles on exam and ordered a CT chest that showed mild-moderate reticulation with traction bronchiectasis.  This is more prominent in the lower lobes.  There was a thin-walled cyst posterior basal left lower lobe.     No childhood lung disease.  No history of asthma or other lung disease.  She had COVID 19 in 2021 without hospitalization, and only had mild symptoms.     She quit for 3 months, smoked for 10 years.  She was at 1 pack per day.  No alcohol or drugs.  No other inhalational substances.    No pets or animals here or in [place].  No exposure to mold/water damage.  No recent renovations at home.    She is on valsartan, bisoprolol, linaclotide, pecanatide, gabapentin, Vitamin D.  In [place] she was on aspirin, statin.  No other supplments.      No known autoimmune disease.  No significant joint pain or swelling.  No rashes.  No dysphagia/significant reflux.  No dry eyes/dry mouth.  She has seasonal  "allergy itchiness.      No history of cancer, chemotherapy/radiation.    Father and brother had lung cancer.      Historical Information   History reviewed. No pertinent past medical history.  History reviewed. No pertinent surgical history.  Family History   Problem Relation Age of Onset    Breast cancer Neg Hx        Meds/Allergies     Current Outpatient Medications:     bisoprolol (ZEBETA) 5 mg tablet, Take 1 tablet (5 mg total) by mouth daily, Disp: 90 tablet, Rfl: 3    ergocalciferol (VITAMIN D2) 50,000 units, Take 1 capsule (50,000 Units total) by mouth once a week, Disp: 12 capsule, Rfl: 2    gabapentin (Neurontin) 100 mg capsule, Take 1 capsule (100 mg total) by mouth 2 (two) times a day, Disp: 60 capsule, Rfl: 1    linaCLOtide 145 MCG CAPS, Take 1 capsule (145 mcg total) by mouth daily at least 30 minutes prior to the first meal of the day, Disp: 90 capsule, Rfl: 3    Plecanatide 3 MG TABS, Take 3 mg by mouth Daily at 2am, Disp: 90 tablet, Rfl: 3    rosuvastatin (CRESTOR) 5 mg tablet, Take 1 tablet (5 mg total) by mouth daily, Disp: 90 tablet, Rfl: 3    valsartan (DIOVAN) 80 mg tablet, Take 1 tablet (80 mg total) by mouth daily, Disp: 90 tablet, Rfl: 3  No Known Allergies    Vitals: Blood pressure 114/62, pulse 87, temperature 99.3 °F (37.4 °C), temperature source Tympanic, height 4' 11\" (1.499 m), weight 75.3 kg (166 lb), SpO2 96%. Body mass index is 33.53 kg/m². Oxygen Therapy  SpO2: 96 %  Oxygen Therapy: None (Room air)    Physical Exam  Vitals and nursing note reviewed.   Constitutional:       General: She is not in acute distress.     Appearance: She is well-developed. She is not ill-appearing, toxic-appearing or diaphoretic.   HENT:      Head: Normocephalic and atraumatic.      Mouth/Throat:      Mouth: Mucous membranes are moist.      Pharynx: Oropharynx is clear. No oropharyngeal exudate.   Eyes:      General: No scleral icterus.     Extraocular Movements: Extraocular movements intact.      " "Conjunctiva/sclera: Conjunctivae normal.   Cardiovascular:      Rate and Rhythm: Normal rate and regular rhythm.   Pulmonary:      Effort: Pulmonary effort is normal. No respiratory distress.      Breath sounds: No stridor. Rales present.   Abdominal:      Tenderness: There is no guarding.   Musculoskeletal:         General: No swelling.      Cervical back: Normal range of motion and neck supple. No rigidity.      Right lower leg: No edema.      Left lower leg: No edema.   Skin:     General: Skin is warm and dry.      Coloration: Skin is not jaundiced.   Neurological:      General: No focal deficit present.      Mental Status: She is alert and oriented to person, place, and time. Mental status is at baseline.   Psychiatric:         Mood and Affect: Mood normal.         Labs:   I have personally reviewed pertinent lab results.    ABG: No results found for: \"PHART\", \"AOZ8SUK\", \"PO2ART\", \"VDN5OXY\", \"I6LQEFLS\", \"BEART\", \"SOURCE\",   BNP: No results found for: \"BNP\",   CBC:  Lab Results   Component Value Date    WBC 6.22 05/13/2024    HGB 12.2 05/13/2024    HCT 38.7 05/13/2024    MCV 98 05/13/2024     05/13/2024    EOSPCT 3 05/13/2024    EOSABS 0.16 05/13/2024    NEUTOPHILPCT 50 05/13/2024    LYMPHOPCT 38 05/13/2024   ,   CMP: No results found for: \"SODIUM\", \"K\", \"CL\", \"CO2\", \"ANIONGAP\", \"BUN\", \"CREATININE\", \"GLUCOSE\", \"CALCIUM\", \"AST\", \"ALT\", \"ALKPHOS\", \"PROT\", \"BILITOT\", \"EGFR\",   PT/INR: No results found for: \"PT\", \"INR\",   Troponin: No results found for: \"TROPONINI\"    Imaging and other studies: I have personally reviewed pertinent reports.   and I have personally reviewed pertinent films in PACS  5/28/2024 CT chest without contrast  Lower lobe predominant reticulation with traction bronchiectasis.  Mild groundglass opacity.  1 thin-walled cyst posterior basal left lower lobe.  No cardiomegaly.  No significant pleural disease.  No suspicious nodules.    Pulmonary function testing: pending    Transthoracic " "Echo:  6/11/24    Left Ventricle: Left ventricular cavity size is normal. Wall thickness is normal. The left ventricular ejection fraction is 61% by single dimension measurement. Systolic function is normal. Wall motion is normal. Diastolic function is normal.  Left atrial filling pressure is normal.    Left Atrium: The atrium is mildly dilated (35-41 mL/m2).    Tricuspid Valve: The right ventricular systolic pressure is normal. The estimated right ventricular systolic pressure is 20.00 mmHg.    Abran Manuel MD  Pulmonary, Critical Care and Sleep Medicine  [facility]     Portions of the record may have been created with voice recognition software. Occasional wrong word or \"sound a like\" substitutions may have occurred due to the inherent limitations of voice recognition software. Please read the chart carefully and recognize, using context, where substitutions have occurred.     "

## 2024-06-19 NOTE — TELEPHONE ENCOUNTER
PA for PA for  linaCLOtide 145 MCG  Denied    Reason:(Screenshot if applicable)        Message sent to office clinical pool Yes    Denial letter scanned into Media Yes    Appeal started No ( Provider will need to decide if appeal is warranted and send clinical documentation to PA team for initiation.)    **Please follow up with your patient regarding denial and next steps**

## 2024-06-20 LAB — ALDOLASE SERPL-CCNC: 5.9 U/L (ref 3.3–10.3)

## 2024-06-27 ENCOUNTER — TELEPHONE (OUTPATIENT)
Dept: PAIN MEDICINE | Facility: CLINIC | Age: 64
End: 2024-06-27

## 2024-07-03 DIAGNOSIS — M54.16 LUMBAR RADICULOPATHY: ICD-10-CM

## 2024-07-03 LAB — MISCELLANEOUS LAB TEST RESULT: NORMAL

## 2024-07-03 RX ORDER — GABAPENTIN 100 MG/1
CAPSULE ORAL
Qty: 60 CAPSULE | Refills: 1 | Status: SHIPPED | OUTPATIENT
Start: 2024-07-03

## 2024-07-10 ENCOUNTER — TELEPHONE (OUTPATIENT)
Dept: PULMONOLOGY | Facility: CLINIC | Age: 64
End: 2024-07-10

## 2024-07-10 NOTE — TELEPHONE ENCOUNTER
Tried calling pt's daughter and pt to discuss lab results - phone number goes to voicemail, left  with Vietnamese

## 2024-08-01 DIAGNOSIS — J84.9 INTERSTITIAL LUNG DISEASE (HCC): Primary | ICD-10-CM

## 2024-08-22 DIAGNOSIS — M54.16 LUMBAR RADICULOPATHY: ICD-10-CM

## 2024-08-22 RX ORDER — GABAPENTIN 100 MG/1
CAPSULE ORAL
Qty: 60 CAPSULE | Refills: 5 | Status: SHIPPED | OUTPATIENT
Start: 2024-08-22

## 2024-09-11 ENCOUNTER — OFFICE VISIT (OUTPATIENT)
Dept: FAMILY MEDICINE CLINIC | Facility: CLINIC | Age: 64
End: 2024-09-11
Payer: COMMERCIAL

## 2024-09-11 VITALS
HEIGHT: 59 IN | RESPIRATION RATE: 16 BRPM | HEART RATE: 78 BPM | WEIGHT: 167 LBS | SYSTOLIC BLOOD PRESSURE: 126 MMHG | BODY MASS INDEX: 33.67 KG/M2 | DIASTOLIC BLOOD PRESSURE: 80 MMHG | TEMPERATURE: 98.4 F | OXYGEN SATURATION: 99 %

## 2024-09-11 DIAGNOSIS — K59.04 CHRONIC IDIOPATHIC CONSTIPATION: ICD-10-CM

## 2024-09-11 DIAGNOSIS — Z11.4 SCREENING FOR HIV (HUMAN IMMUNODEFICIENCY VIRUS): ICD-10-CM

## 2024-09-11 DIAGNOSIS — Z12.4 SCREENING FOR CERVICAL CANCER: ICD-10-CM

## 2024-09-11 DIAGNOSIS — M54.16 LUMBAR RADICULOPATHY: ICD-10-CM

## 2024-09-11 DIAGNOSIS — Z00.01 ENCOUNTER FOR GENERAL ADULT MEDICAL EXAMINATION WITH ABNORMAL FINDINGS: Primary | ICD-10-CM

## 2024-09-11 DIAGNOSIS — E55.9 VITAMIN D DEFICIENCY: ICD-10-CM

## 2024-09-11 DIAGNOSIS — Z11.59 NEED FOR HEPATITIS C SCREENING TEST: ICD-10-CM

## 2024-09-11 DIAGNOSIS — E78.49 OTHER HYPERLIPIDEMIA: ICD-10-CM

## 2024-09-11 DIAGNOSIS — R73.09 ELEVATED HEMOGLOBIN A1C: ICD-10-CM

## 2024-09-11 DIAGNOSIS — Z23 FLU VACCINE NEED: ICD-10-CM

## 2024-09-11 DIAGNOSIS — I10 PRIMARY HYPERTENSION: ICD-10-CM

## 2024-09-11 PROCEDURE — 90471 IMMUNIZATION ADMIN: CPT

## 2024-09-11 PROCEDURE — 99396 PREV VISIT EST AGE 40-64: CPT | Performed by: INTERNAL MEDICINE

## 2024-09-11 PROCEDURE — 99214 OFFICE O/P EST MOD 30 MIN: CPT | Performed by: INTERNAL MEDICINE

## 2024-09-11 PROCEDURE — 90673 RIV3 VACCINE NO PRESERV IM: CPT

## 2024-09-11 RX ORDER — BISOPROLOL FUMARATE 5 MG/1
5 TABLET, FILM COATED ORAL DAILY
Qty: 90 TABLET | Refills: 3 | Status: SHIPPED | OUTPATIENT
Start: 2024-09-11

## 2024-09-11 RX ORDER — VALSARTAN 80 MG/1
80 TABLET ORAL DAILY
Qty: 90 TABLET | Refills: 3 | Status: SHIPPED | OUTPATIENT
Start: 2024-09-11

## 2024-09-11 RX ORDER — ERGOCALCIFEROL 1.25 MG/1
50000 CAPSULE, LIQUID FILLED ORAL WEEKLY
Qty: 12 CAPSULE | Refills: 2 | Status: SHIPPED | OUTPATIENT
Start: 2024-09-11

## 2024-09-11 RX ORDER — ROSUVASTATIN CALCIUM 5 MG/1
5 TABLET, COATED ORAL DAILY
Qty: 90 TABLET | Refills: 3 | Status: SHIPPED | OUTPATIENT
Start: 2024-09-11

## 2024-09-11 NOTE — PROGRESS NOTES
Ambulatory Visit  Name: Young Quiñonez      : 1960      MRN: [id number]  Encounter Provider: Parveen Sosa MD  Encounter Date: 2024   Encounter department: [facility] CARE [facility]    Assessment & Plan  Need for hepatitis C screening test    Orders:    Hepatitis C Antibody; Future    Screening for HIV (human immunodeficiency virus)    Orders:    HIV 1/2 AG/AB w Reflex [facility] for 2 yr old and above; Future    Screening for cervical cancer    Orders:    Ambulatory referral to Obstetrics / Gynecology; Future    Chronic idiopathic constipation         Elevated hemoglobin A1c         Encounter for general adult medical examination with abnormal findings    Orders:    UA (URINE) with reflex to Scope; Future    Magnesium; Future    CBC and differential; Future    Comprehensive metabolic panel; Future    Lipid panel; Future    Primary hypertension    Orders:    bisoprolol (ZEBETA) 5 mg tablet; Take 1 tablet (5 mg total) by mouth daily    valsartan (DIOVAN) 80 mg tablet; Take 1 tablet (80 mg total) by mouth daily    Vitamin D deficiency    Orders:    ergocalciferol (VITAMIN D2) 50,000 units; Take 1 capsule (50,000 Units total) by mouth once a week    Other hyperlipidemia    Orders:    rosuvastatin (CRESTOR) 5 mg tablet; Take 1 tablet (5 mg total) by mouth daily    Lumbar radiculopathy    Orders:    Ambulatory referral to Spine & Pain Management; Future    Flu vaccine need    Orders:    influenza vaccine, recombinant, PF, 0.5 mL IM (Flublok)  Life style Mod  RTC in 3 mos w Blood work  Annual Physical Exam : done in detail.       History of Present Illness     64 Y O lady is here for Annual Physical exam and Regular check up, she has few symptoms, no recent Blood work, Med list reviewed,...          Review of Systems   Constitutional:  Negative for chills, fatigue and fever.   HENT:  Negative for congestion, facial swelling, sore throat, trouble swallowing and voice change.    Eyes:   "Negative for pain, discharge and visual disturbance.   Respiratory:  Negative for cough, shortness of breath and wheezing.    Cardiovascular:  Negative for chest pain, palpitations and leg swelling.   Gastrointestinal:  Negative for abdominal pain, blood in stool, constipation, diarrhea and nausea.   Endocrine: Negative for polydipsia, polyphagia and polyuria.   Genitourinary:  Negative for difficulty urinating, hematuria and urgency.   Musculoskeletal:  Positive for back pain. Negative for arthralgias and myalgias.   Skin:  Negative for rash.   Neurological:  Positive for numbness. Negative for dizziness, tremors, weakness and headaches.   Hematological:  Negative for adenopathy. Does not bruise/bleed easily.   Psychiatric/Behavioral:  Negative for dysphoric mood, sleep disturbance and suicidal ideas.            Objective     /80 (BP Location: Left arm, Patient Position: Sitting, Cuff Size: Standard)   Pulse 78   Temp 98.4 °F (36.9 °C) (Tympanic)   Resp 16   Ht 4' 11\" (1.499 m)   Wt 75.8 kg (167 lb)   SpO2 99%   BMI 33.73 kg/m²     Physical Exam  Constitutional:       General: She is not in acute distress.     Appearance: She is well-developed. She is not diaphoretic.   HENT:      Head: Normocephalic.      Right Ear: External ear normal.      Left Ear: External ear normal.      Nose: Nose normal.   Eyes:      General:         Right eye: No discharge.         Left eye: No discharge.      Extraocular Movements: EOM normal.      Conjunctiva/sclera: Conjunctivae normal.      Pupils: Pupils are equal, round, and reactive to light.   Neck:      Thyroid: No thyromegaly.      Trachea: No tracheal deviation.   Cardiovascular:      Rate and Rhythm: Normal rate and regular rhythm.      Heart sounds: Normal heart sounds. No murmur heard.     No friction rub.   Pulmonary:      Effort: Pulmonary effort is normal. No respiratory distress.      Breath sounds: Normal breath sounds. No stridor. No wheezing or rales. "   Abdominal:      General: Bowel sounds are normal. There is no distension.      Palpations: Abdomen is soft.      Tenderness: There is no abdominal tenderness. There is no guarding.   Musculoskeletal:         General: Tenderness present. No deformity or edema. Normal range of motion.      Cervical back: Normal range of motion and neck supple.   Lymphadenopathy:      Cervical: No cervical adenopathy.   Skin:     General: Skin is warm.      Coloration: Skin is not pale.      Findings: No erythema or rash.   Neurological:      Mental Status: She is alert and oriented to person, place, and time.      Cranial Nerves: No cranial nerve deficit.      Sensory: Sensory deficit present.      Coordination: Coordination normal.   Psychiatric:         Mood and Affect: Mood and affect normal.         Behavior: Behavior normal.

## 2024-09-12 ENCOUNTER — TELEPHONE (OUTPATIENT)
Age: 64
End: 2024-09-12

## 2024-09-17 ENCOUNTER — OFFICE VISIT (OUTPATIENT)
Dept: PAIN MEDICINE | Facility: CLINIC | Age: 64
End: 2024-09-17
Payer: COMMERCIAL

## 2024-09-17 VITALS
HEIGHT: 59 IN | DIASTOLIC BLOOD PRESSURE: 66 MMHG | WEIGHT: 167 LBS | BODY MASS INDEX: 33.67 KG/M2 | SYSTOLIC BLOOD PRESSURE: 120 MMHG

## 2024-09-17 DIAGNOSIS — M48.061 SPINAL STENOSIS OF LUMBAR REGION, UNSPECIFIED WHETHER NEUROGENIC CLAUDICATION PRESENT: ICD-10-CM

## 2024-09-17 DIAGNOSIS — M54.16 LUMBAR RADICULOPATHY: Primary | ICD-10-CM

## 2024-09-17 PROCEDURE — 99214 OFFICE O/P EST MOD 30 MIN: CPT | Performed by: ANESTHESIOLOGY

## 2024-09-17 NOTE — PROGRESS NOTES
Assessment:  1. Lumbar radiculopathy    2. Spinal stenosis of lumbar region, unspecified whether neurogenic claudication present        Patient presents with daughter today who provided Bulgarian interpretation as well as acting as an independent historian.     Patient presenting for follow-up visit.  She has a history of chronic back with right radicular leg pain for greater than 1 year, worsening over the past several months.  Pain is consistent with lumbar radicular pain, lumbar spinal stenosis accompanied by pain >7/10 on the pain scale with inability to participate in IADLs for >6 weeks.      Patient has participated with chiropractic therapy in [place]. She states she is fearful of trying chiropractic or physical therapy again as it worsened her symptoms.      Patient has tried gabapentin with modest benefit.       Independently reviewed and interpreted external lumbar MRI. This showed mild to moderate spinal stenosis from L3-S1 with vary degrees of foraminal stenosis.     Plan:     After right L4-5 LESI performed on 6/13/2024, the patient reported greater than 75% improvement for 1 month followed by 50% improvement overall for 3 months.    At this time her symptoms have returned to a similar pattern with right greater than left radiating leg pain symptoms.    We discussed and offered repeat right L4-5 LESI.  Patient and daughter were agreeable with scheduling.  Risks, benefits, and alternatives to epidural steroid injections discussed with patient.   Complete risks and benefits including bleeding, infection, tissue reaction, nerve injury and allergic reaction were discussed. The approach was demonstrated using models and literature was provided.     Reviewed external notes from family medicine office in regards to recent and prior relevant medical histories, treatment recommendations, medication and/or interventional treatment responses.     Reviewed hemoglobin A1c, renal function, CBC and/or PT/INR  prior to discussing/offering interventional modalities.     My impressions and treatment recommendations were discussed in detail with the patient who verbalized understanding and had no further questions.  Discharge instructions were provided. I personally saw and examined the patient and I agree with the above discussed plan of care.    Orders Placed This Encounter   Procedures    FL spine and pain procedure     Standing Status:   Future     Standing Expiration Date:   9/17/2028     Order Specific Question:   Reason for Exam:     Answer:   right L4-5 LESI     Order Specific Question:   Anticoagulant hold needed?     Answer:   no     No orders of the defined types were placed in this encounter.      History of Present Illness:  Young Quiñonez is a 64 y.o. female who presents for a follow up office visit in regards to Back Pain.   The patient’s current symptoms include continued back and right radiating leg pain symptoms.  Pain is rated 9 out of 10 on the pain scale at times is described as intermittent dull/aching, sharp, pressure like, cramping pain with numbness worse at night.    I have personally reviewed and/or updated the patient's past medical history, past surgical history, family history, social history, current medications, allergies, and vital signs today.     Review of Systems   Constitutional:  Negative for chills and fever.   HENT:  Negative for ear pain and sore throat.    Eyes:  Negative for pain and visual disturbance.   Respiratory:  Negative for cough and shortness of breath.    Cardiovascular:  Negative for chest pain and palpitations.   Gastrointestinal:  Negative for abdominal pain and vomiting.   Genitourinary:  Negative for dysuria and hematuria.   Musculoskeletal:  Positive for back pain, gait problem and myalgias. Negative for arthralgias.   Skin:  Negative for color change and rash.   Neurological:  Positive for weakness. Negative for seizures and syncope.   All other systems reviewed and  "are negative.      Patient Active Problem List   Diagnosis    Lumbar radiculopathy    Interstitial lung disease (HCC)       History reviewed. No pertinent past medical history.    History reviewed. No pertinent surgical history.    Family History   Problem Relation Age of Onset    Breast cancer Neg Hx        Social History     Occupational History    Not on file   Tobacco Use    Smoking status: Former     Current packs/day: 0.00     Average packs/day: 1 pack/day for 42.0 years (42.0 ttl pk-yrs)     Types: Cigarettes     Start date:      Quit date:      Years since quittin.7    Smokeless tobacco: Never   Vaping Use    Vaping status: Never Used   Substance and Sexual Activity    Alcohol use: Not Currently    Drug use: Never    Sexual activity: Not Currently       Current Outpatient Medications on File Prior to Visit   Medication Sig    bisoprolol (ZEBETA) 5 mg tablet Take 1 tablet (5 mg total) by mouth daily    ergocalciferol (VITAMIN D2) 50,000 units Take 1 capsule (50,000 Units total) by mouth once a week    gabapentin (NEURONTIN) 100 mg capsule AS DIRECTED    linaCLOtide 145 MCG CAPS Take 1 capsule (145 mcg total) by mouth daily at least 30 minutes prior to the first meal of the day    Plecanatide 3 MG TABS Take 3 mg by mouth Daily at 2am    rosuvastatin (CRESTOR) 5 mg tablet Take 1 tablet (5 mg total) by mouth daily    valsartan (DIOVAN) 80 mg tablet Take 1 tablet (80 mg total) by mouth daily     No current facility-administered medications on file prior to visit.       No Known Allergies    Physical Exam:    /66   Ht 4' 11\" (1.499 m)   Wt 75.8 kg (167 lb)   BMI 33.73 kg/m²     Constitutional:normal, well developed, well nourished, alert, in no distress and non-toxic and no overt pain behavior.  Eyes:anicteric  HEENT:grossly intact  Neck:supple, symmetric, trachea midline and no masses   Pulmonary:even and unlabored  Cardiovascular:No edema or pitting edema present  Skin:Normal without rashes " or lesions and well hydrated  Psychiatric:Mood and affect appropriate  Neurologic: Motor function is grossly intact with no focal neurologic deficits   Musculoskeletal: Slow gait with limited lumbar spine range of motion.  Nonantalgic.    Imaging

## 2024-09-23 RX ORDER — ALBUTEROL SULFATE 0.83 MG/ML
2.5 SOLUTION RESPIRATORY (INHALATION) ONCE AS NEEDED
Status: COMPLETED | OUTPATIENT
Start: 2024-09-23 | End: 2024-09-24

## 2024-09-24 ENCOUNTER — HOSPITAL ENCOUNTER (OUTPATIENT)
Dept: PULMONOLOGY | Facility: HOSPITAL | Age: 64
Discharge: HOME | End: 2024-09-24
Payer: COMMERCIAL

## 2024-09-24 DIAGNOSIS — R91.8 ABNORMAL CT LUNG SCREENING: ICD-10-CM

## 2024-09-24 PROCEDURE — 94729 DIFFUSING CAPACITY: CPT

## 2024-09-24 PROCEDURE — 94060 EVALUATION OF WHEEZING: CPT

## 2024-09-24 PROCEDURE — 94726 PLETHYSMOGRAPHY LUNG VOLUMES: CPT | Performed by: INTERNAL MEDICINE

## 2024-09-24 PROCEDURE — 94060 EVALUATION OF WHEEZING: CPT | Performed by: INTERNAL MEDICINE

## 2024-09-24 PROCEDURE — 94729 DIFFUSING CAPACITY: CPT | Performed by: INTERNAL MEDICINE

## 2024-09-24 PROCEDURE — 94726 PLETHYSMOGRAPHY LUNG VOLUMES: CPT

## 2024-09-24 PROCEDURE — 94760 N-INVAS EAR/PLS OXIMETRY 1: CPT

## 2024-09-24 RX ADMIN — ALBUTEROL SULFATE 2.5 MG: 2.5 SOLUTION RESPIRATORY (INHALATION) at 09:57

## 2024-09-27 ENCOUNTER — OFFICE VISIT (OUTPATIENT)
Dept: PULMONOLOGY | Facility: CLINIC | Age: 64
End: 2024-09-27
Payer: COMMERCIAL

## 2024-09-27 VITALS
WEIGHT: 168 LBS | BODY MASS INDEX: 33.87 KG/M2 | HEART RATE: 53 BPM | OXYGEN SATURATION: 97 % | DIASTOLIC BLOOD PRESSURE: 82 MMHG | HEIGHT: 59 IN | SYSTOLIC BLOOD PRESSURE: 120 MMHG

## 2024-09-27 DIAGNOSIS — J84.9 INTERSTITIAL LUNG DISEASE (HCC): Primary | ICD-10-CM

## 2024-09-27 DIAGNOSIS — R91.8 ABNORMAL CT LUNG SCREENING: ICD-10-CM

## 2024-09-27 DIAGNOSIS — J30.89 NON-SEASONAL ALLERGIC RHINITIS, UNSPECIFIED TRIGGER: ICD-10-CM

## 2024-09-27 PROCEDURE — 99214 OFFICE O/P EST MOD 30 MIN: CPT | Performed by: INTERNAL MEDICINE

## 2024-09-27 RX ORDER — FLUTICASONE PROPIONATE 50 MCG
2 SPRAY, SUSPENSION (ML) NASAL DAILY
Qty: 16 G | Refills: 5 | Status: SHIPPED | OUTPATIENT
Start: 2024-09-27

## 2024-09-27 NOTE — PROGRESS NOTES
"Office Progress Note - Pulmonary    Young Quiñonez 64 y.o. female MRN: [id number]    Encounter: [id number]      Assessment:  Interstitial lung disease.  Abnormal CT scan of the chest.  Allergic rhinitis.    Plan:   Saline nasal/sinus rinse once a day.  Fluticasone nasal spray 2 sprays to each nostril once a day.  Complete pulmonary function test in 1 year.  Follow-up in 1 year.    Discussion:   The patient's interstitial changes are nonspecific.  She is asymptomatic.  PFTs were normal.  I have reassured the patient.  The plan is to repeat PFTs in 1 year or earlier if she develops symptoms.  She has significant postnasal dripping from allergic rhinitis.  I have started her on saline nasal/sinus rinse once a day.  I have provided her with a sample bottle and showed her how to use it appropriately.  I have started her on fluticasone nasal spray 2 sprays to each nostril once a day.  I will see her after the PFTs are done.      Subjective:   The patient is here for a follow-up visit.  She denies shortness of breath, cough or wheezing.  She has postnasal dripping.  No nocturnal symptoms.    Review of systems:  A 12 point system review is done and aside from what is stated above the rest of the review of systems is negative.      Family history and social history are reviewed.    Medications list is reviewed.      Vitals: Blood pressure 120/82, pulse (!) 53, height 4' 11\" (1.499 m), weight 76.2 kg (168 lb), SpO2 97%.,     Physical Exam  Gen: Awake, alert, oriented x 3, no acute distress  HEENT: Mucous membranes moist, no oral lesions, no thrush  NECK: No accessory muscle use, JVP not elevated  Cardiac: Regular, single S1, single S2, no murmurs, no rubs, no gallops  Lungs: Few basal crackles on the right side.  Abdomen: normoactive bowel sounds, soft nontender, nondistended, no rebound or rigidity, no guarding  Extremities: no cyanosis, no clubbing, no edema  Neuro:  Grossly nonfocal.  Skin:  No rash    Pulmonary function " test is normal.    Serology was negative.

## 2024-10-02 ENCOUNTER — TELEPHONE (OUTPATIENT)
Dept: GASTROENTEROLOGY | Facility: CLINIC | Age: 64
End: 2024-10-02

## 2024-10-02 NOTE — TELEPHONE ENCOUNTER
Called and left a message for the patient to call back to reschedule office visit for 10/2 due to Ericka being ill and not in the office today. We were wanting to see about either getting her in with a different provider today or a different day with Ericka

## 2024-10-03 ENCOUNTER — HOSPITAL ENCOUNTER (OUTPATIENT)
Dept: RADIOLOGY | Facility: MEDICAL CENTER | Age: 64
Discharge: HOME | End: 2024-10-03
Payer: COMMERCIAL

## 2024-10-03 VITALS
RESPIRATION RATE: 18 BRPM | TEMPERATURE: 97.2 F | OXYGEN SATURATION: 97 % | DIASTOLIC BLOOD PRESSURE: 75 MMHG | HEART RATE: 56 BPM | SYSTOLIC BLOOD PRESSURE: 111 MMHG

## 2024-10-03 DIAGNOSIS — M54.16 LUMBAR RADICULOPATHY: ICD-10-CM

## 2024-10-03 PROCEDURE — 62323 NJX INTERLAMINAR LMBR/SAC: CPT | Performed by: ANESTHESIOLOGY

## 2024-10-03 RX ORDER — METHYLPREDNISOLONE ACETATE 80 MG/ML
80 INJECTION, SUSPENSION INTRA-ARTICULAR; INTRALESIONAL; INTRAMUSCULAR; PARENTERAL; SOFT TISSUE ONCE
Status: COMPLETED | OUTPATIENT
Start: 2024-10-03 | End: 2024-10-03

## 2024-10-03 RX ORDER — BUPIVACAINE HCL/PF 2.5 MG/ML
1 VIAL (ML) INJECTION ONCE
Status: COMPLETED | OUTPATIENT
Start: 2024-10-03 | End: 2024-10-03

## 2024-10-03 RX ADMIN — IOHEXOL 1 ML: 300 INJECTION, SOLUTION INTRAVENOUS at 08:53

## 2024-10-03 RX ADMIN — BUPIVACAINE HYDROCHLORIDE 1 ML: 2.5 INJECTION, SOLUTION EPIDURAL; INFILTRATION; INTRACAUDAL at 08:54

## 2024-10-03 RX ADMIN — METHYLPREDNISOLONE ACETATE 80 MG: 80 INJECTION, SUSPENSION INTRA-ARTICULAR; INTRALESIONAL; INTRAMUSCULAR; PARENTERAL; SOFT TISSUE at 08:54

## 2024-10-03 NOTE — H&P
History of Present Illness: The patient is a 64 y.o. female who presents with complaints of back pain    No past medical history on file.    No past surgical history on file.      Current Outpatient Medications:     bisoprolol (ZEBETA) 5 mg tablet, Take 1 tablet (5 mg total) by mouth daily, Disp: 90 tablet, Rfl: 3    ergocalciferol (VITAMIN D2) 50,000 units, Take 1 capsule (50,000 Units total) by mouth once a week, Disp: 12 capsule, Rfl: 2    fluticasone (FLONASE) 50 mcg/act nasal spray, 2 sprays into each nostril daily, Disp: 16 g, Rfl: 5    gabapentin (NEURONTIN) 100 mg capsule, AS DIRECTED, Disp: 60 capsule, Rfl: 5    linaCLOtide 145 MCG CAPS, Take 1 capsule (145 mcg total) by mouth daily at least 30 minutes prior to the first meal of the day (Patient not taking: Reported on 9/27/2024), Disp: 90 capsule, Rfl: 3    Plecanatide 3 MG TABS, Take 3 mg by mouth Daily at 2am, Disp: 90 tablet, Rfl: 3    rosuvastatin (CRESTOR) 5 mg tablet, Take 1 tablet (5 mg total) by mouth daily, Disp: 90 tablet, Rfl: 3    valsartan (DIOVAN) 80 mg tablet, Take 1 tablet (80 mg total) by mouth daily (Patient not taking: Reported on 9/27/2024), Disp: 90 tablet, Rfl: 3    Current Facility-Administered Medications:     bupivacaine (PF) (MARCAINE) 0.25 % injection 1 mL, 1 mL, Epidural, Once, Will Berry Hamlin MD    iohexol (OMNIPAQUE) 300 mg/mL injection 1 mL, 1 mL, Epidural, Once, Will Berry Hamlin MD    methylPREDNISolone acetate (DEPO-MEDROL) injection 80 mg, 80 mg, Epidural, Once, Will Berry Hamlin MD    No Known Allergies    Physical Exam:   Vitals:    10/03/24 0843   BP: 105/67   Pulse: 57   Resp: 16   Temp: (!) 97.2 °F (36.2 °C)   SpO2: 97%     General: Awake, Alert, Oriented x 3, Mood and affect appropriate  Respiratory: Respirations even and unlabored  Cardiovascular: Peripheral pulses intact; no edema  Musculoskeletal Exam: back pain    ASA Score: 1    Patient/Chart Verification  Patient ID Verified: Verbal  Consents Confirmed:  Procedural, To be obtained in the Pre-Procedure area  H&P( within 30 days) Verified: To be obtained in the Procedural area  Allergies Reviewed: Yes  Anticoag/NSAID held?: NA  Currently on antibiotics?: No  Pregnancy denied?: NA    Assessment:   1. Lumbar radiculopathy        Plan: right L4-5 LESI

## 2024-10-03 NOTE — DISCHARGE INSTRUCTIONS
Epidural Steroid Injection   WHAT YOU NEED TO KNOW:   An epidural steroid injection (PAT) is a procedure to inject steroid medicine into the epidural space. The epidural space is between your spinal cord and vertebrae. Steroids reduce inflammation and fluid buildup in your spine that may be causing pain. You may be given pain medicine along with the steroids.          ACTIVITY  Do not drive or operate machinery today.  No strenuous activity today - bending, lifting, etc.  You may resume normal activites starting tomorrow - start slowly and as tolerated.  You may shower today, but no tub baths or hot tubs.  You may have numbness for several hours from the local anesthetic. Please use caution and common sense, especially with weight-bearing activities.    CARE OF THE INJECTION SITE  If you have soreness or pain, apply ice to the area today (20 minutes on/20 minutes off).  Starting tomorrow, you may use warm, moist heat or ice if needed.  You may have an increase or change in your discomfort for 36-48 hours after your treatment.  Apply ice and continue with any pain medication you have been prescribed.  Notify the [facility] if you have any of the following: redness, drainage, swelling, headache, stiff neck or fever above 100°F.    SPECIAL INSTRUCTIONS  Our office will contact you in approximately 14 days for a progress report.    MEDICATIONS  Continue to take all routine medications.  Our office may have instructed you to hold some medications.    As no general anesthesia was used in today's procedure, you should not experience any side effects related to anesthesia.     If you are diabetic, the steroids used in today's injection may temporarily increase your blood sugar levels after the first few days after your injection. Please keep a close eye on your sugars and alert the doctor who manages your diabetes if your sugars are significantly high from your baseline or you are symptomatic.     If you have a  problem specifically related to your procedure, please call our office at [phone].  Problems not related to your procedure should be directed to your primary care physician.

## 2024-10-11 ENCOUNTER — TELEPHONE (OUTPATIENT)
Age: 64
End: 2024-10-11

## 2024-10-11 DIAGNOSIS — Z01.00 EYE EXAM, ROUTINE: Primary | ICD-10-CM

## 2024-10-11 NOTE — TELEPHONE ENCOUNTER
Rcvd call from daughter who said that patient needs new glasses. States she needs a referral so that insurance will cover it. Told her that typically you don't need a referral for an optometrist for a routine eye exam. Dtr said that she was told that if Dr. Sosa makes the referral then it will be covered under medical insurance. States that he has made the referral for patient's spouse as well.   She would like a referral sent to:  [facility] in [place].   Phone # is [phone]  Fax # [phone]    Please follow up with daughter when referral has been placed.

## 2024-10-11 NOTE — TELEPHONE ENCOUNTER
Spoke to Chapo. She was advised to change to [facility] or [facility], most doctors do not accept plain access

## 2024-10-11 NOTE — TELEPHONE ENCOUNTER
Patient's daughter reports that the eye center the referral was faxed to still wants to charge patient $150 for an eye exam when she thought it was free. She would like a call back to advise if patient's PCP knows of any places patients on Medicaid can go for eye exams for free or a lower cost.

## 2024-10-17 ENCOUNTER — OFFICE VISIT (OUTPATIENT)
Dept: GASTROENTEROLOGY | Facility: CLINIC | Age: 64
End: 2024-10-17
Payer: COMMERCIAL

## 2024-10-17 ENCOUNTER — TELEPHONE (OUTPATIENT)
Dept: PAIN MEDICINE | Facility: CLINIC | Age: 64
End: 2024-10-17

## 2024-10-17 VITALS
TEMPERATURE: 98.3 F | HEIGHT: 59 IN | DIASTOLIC BLOOD PRESSURE: 56 MMHG | WEIGHT: 168.4 LBS | BODY MASS INDEX: 33.95 KG/M2 | SYSTOLIC BLOOD PRESSURE: 98 MMHG

## 2024-10-17 DIAGNOSIS — K59.09 CHRONIC CONSTIPATION: Primary | ICD-10-CM

## 2024-10-17 PROCEDURE — 99244 OFF/OP CNSLTJ NEW/EST MOD 40: CPT | Performed by: PHYSICIAN ASSISTANT

## 2024-10-17 RX ORDER — LUBIPROSTONE 8 UG/1
8 CAPSULE ORAL 2 TIMES DAILY WITH MEALS
Qty: 60 CAPSULE | Refills: 2 | Status: SHIPPED | OUTPATIENT
Start: 2024-10-17

## 2024-10-17 NOTE — PATIENT INSTRUCTIONS
Scheduled date of colonoscopy (as of today):10.31.24  Physician performing colonoscopy:DR CLAY  Location of colonoscopy:  Bowel prep reviewed with patient:TATO  Instructions reviewed with patient by:ALEX  Clearances: N/A

## 2024-10-17 NOTE — PROGRESS NOTES
[facility] - Outpatient Consultation  Young Quiñonez 64 y.o. female MRN: [id number]  Encounter: [id number]          ASSESSMENT AND PLAN:      Young is a 64-year-old Lao speaking female with hypertension, and interstitial lung disease who presents for consultation of constipation.    1. Chronic constipation  Patient says that for many years since she was young she would struggle with constipation.  She says that she will go about 3 days without moving her bowels and this can cause a lot of bloating and lower abdominal pain.  She was sent in [employer] [address] and [employer] but these were not covered by her insurance.  This was sent in because she tried and failed MiraLAX, stool softeners, fiber supplement.  She had a negative Cologuard this year but has never undergone a colonoscopy so she and her daughter would like 1 done. TSH WNL.   -increase daily water intake to at least 64 ounces of water/day   - lubiprostone (AMITIZA) 8 mcg capsule; Take 1 capsule (8 mcg total) by mouth 2 (two) times a day with meals  Dispense: 60 capsule; Refill: 2  - Colonoscopy; Future    ______________________________________________________________________    HPI:  Young is a 64-year-old Lao speaking female with hypertension, and interstitial lung disease who presents for consultation of constipation. Patient says that for many years since she was young she would struggle with constipation.  She says that she will go about 3 days without moving her bowels and this can cause a lot of bloating and lower abdominal pain.  She was sent in [employer] [address] and Trulance but these were not covered by her insurance.  This was sent in because she tried and failed MiraLAX, stool softeners, fiber supplement.  She says she gets reflux once in a while but when she is constipated it is worse.  She denies nausea vomiting, trouble swallowing or eating, family history of colon cancer, unintentional weight loss, fevers, chills,  "diarrhea, bloody or black stools.      REVIEW OF SYSTEMS:    CONSTITUTIONAL: Denies any fever, chills, rigors, and weight loss.  HEENT: No earache or tinnitus. Denies hearing loss or visual disturbances.  CARDIOVASCULAR: No chest pain or palpitations.   RESPIRATORY: Denies any cough, hemoptysis, shortness of breath or dyspnea on exertion.  GASTROINTESTINAL: As noted in the History of Present Illness.   GENITOURINARY: No problems with urination. Denies any hematuria or dysuria.  NEUROLOGIC: No dizziness or vertigo, denies headaches.   MUSCULOSKELETAL: Denies any muscle or joint pain.   SKIN: Denies skin rashes or itching.   ENDOCRINE: Denies excessive thirst. Denies intolerance to heat or cold.  PSYCHOSOCIAL: Denies depression or anxiety. Denies any recent memory loss.       Historical Information   History reviewed. No pertinent past medical history.  History reviewed. No pertinent surgical history.  Social History   Social History     Substance and Sexual Activity   Alcohol Use Not Currently     Social History     Substance and Sexual Activity   Drug Use Never     Social History     Tobacco Use   Smoking Status Former    Current packs/day: 0.00    Average packs/day: 1 pack/day for 42.0 years (42.0 ttl pk-yrs)    Types: Cigarettes    Start date:     Quit date:     Years since quittin.7   Smokeless Tobacco Never     Family History   Problem Relation Age of Onset    Breast cancer Neg Hx        Meds/Allergies       Current Outpatient Medications:     bisoprolol (ZEBETA) 5 mg tablet    ergocalciferol (VITAMIN D2) 50,000 units    fluticasone (FLONASE) 50 mcg/act nasal spray    gabapentin (NEURONTIN) 100 mg capsule    lubiprostone (AMITIZA) 8 mcg capsule    rosuvastatin (CRESTOR) 5 mg tablet    valsartan (DIOVAN) 80 mg tablet    No Known Allergies        Objective     Blood pressure 98/56, temperature 98.3 °F (36.8 °C), temperature source Tympanic, height 4' 11\" (1.499 m), weight 76.4 kg (168 lb 6.4 oz). Body " mass index is 34.01 kg/m².        PHYSICAL EXAM:      General Appearance:   Alert, cooperative, no distress   HEENT:   Normocephalic, atraumatic, anicteric.     Neck:  Supple, symmetrical, trachea midline   Lungs:   Clear to auscultation bilaterally; no rales, rhonchi or wheezing; respirations unlabored    Heart::   Regular rate and rhythm; no murmur, rub, or gallop.   Abdomen:   Soft, non-tender, non-distended; normal bowel sounds; no masses, no organomegaly    Genitalia:   Deferred    Rectal:   Deferred    Extremities:  No cyanosis, clubbing or edema    Pulses:  2+ and symmetric    Skin:  No jaundice, rashes, or lesions    Lymph nodes:  No palpable cervical lymphadenopathy        Lab Results:   No visits with results within 1 Day(s) from this visit.   Latest known visit with results is:   Appointment on 06/19/2024   Component Date Value    Miscellaneous Lab Test R* 06/19/2024 INTERSTITIAL LUNG DISEASE PANEL     Sodium 06/19/2024 140     Potassium 06/19/2024 3.6     Chloride 06/19/2024 104     CO2 06/19/2024 26     ANION GAP 06/19/2024 10     BUN 06/19/2024 23     Creatinine 06/19/2024 0.47 (L)     Glucose 06/19/2024 112     Calcium 06/19/2024 9.4     AST 06/19/2024 19     ALT 06/19/2024 13     Alkaline Phosphatase 06/19/2024 51     Total Protein 06/19/2024 7.1     Albumin 06/19/2024 4.1     Total Bilirubin 06/19/2024 0.35     eGFR 06/19/2024 106     CRP 06/19/2024 <1.0     Total CK 06/19/2024 111     Aldolase 06/19/2024 5.9     Sed Rate 06/19/2024 44 (H)          Radiology Results:   FL spine and pain procedure    Result Date: 10/3/2024  Narrative: Indications: Low back pain, leg pain Preoperative Diagnosis: lumbar radiculitis Post-operative diagnosis: lumbar radiculitis Procedure: L4-L5 Interlaminar Epidural Steroid Injection under Fluoroscopic Guidance Surgeon: Kit Hamlin MD Assistant: None Anesthesia: Local Details of the Procedure: I was present and scrubbed for the entire procedure. The skin over the injection  site was also marked and confirmed as the site of the injection. After written informed consent, the patient was brought into the procedure room and placed in the prone position. Monitors were placed. A TIME OUT was observed during which all the salient details including the correct patient identity, correct procedure, patient’s allergies, site/side, etc., were verified and agreed upon by all parties present. The patient’s back was prepped with chlorhexidine and draped in the usual sterile fashion. All skin injection sites were anesthetized with 1% Lidocaine using a 25 gauge needle. The procedure needle was a 20 gauge, 3.5 inch Touhy needle. The spine was visualized, utilizing fluoroscopy in the AP view, to identify the appropriate interlaminar space. A  right paramedian approach was taken. A target point was chosen over the  L4-L5 interlaminar space. Loss of resistance was achieved with PF normal saline. There was no heme or CSF aspirated. There were no persistent paresthesias elicited. EPIDUROGRAM: At this point 1 cc of 300mg/mL Omnipaque was injected slowly through the needle without difficulty. There was no intravascular or intrathecal uptake under real time imaging. This showed spread of dye mostly right from L5 to L4. AP and contralateral oblique views were used to confirm epidural spread and lack of intravascular/intrathecal spread. At this point, 80 mg of DepoMedrol mixed with 1mL of PF 0.25 % Bupivicaine and 3mL of PF Normal Saline was injected slowly through the needle without difficulty. As the needle was removed, it was flushed with flushed with preservative free normal saline to clear the needle of residual steroid and removed intact. Each injection site was cleaned and covered with an adhesive bandage. The patient tolerated the procedure without any difficulty and was taken to the recovery room in a stable condition.     Complete PFT with post bronchodilator    Result Date: 9/26/2024  Narrative: Table  formatting from the original result was not included. Pulmonary Function Test Report Young Quiñonez 64 y.o. female MRN: [id number] Date Performed: 9/24/24 Date Interpretation: 9/26/2024 Respiratory Technician Comments: The patient's efforts are acceptable and reproducible..The results of this test meet ATS standards for acceptability and repeatability.. Reference Set: [facility] GLOBAL 2022 Results:   % Predicted Z-Score FVC 2.62 L 110% 0.7 FEV1 2.26 L 118% 1.25 FEV1/FVC 86  0.95  After administration of bronchodilator:    % Change FVC 2.62 L 0 FEV1 2.28 L 0  Lung volumes by body plethysmography:   % Predicted Z-Score Total Lung Capacity 4.28L 99% -0.04 Residual Volume 1.66L 93% -0.32 RV/TLC Ratio 39    DLCO was corrected for patients hemoglobin level:   % Predicted Z-Score DLCO 17.98 102% Not recorded   Interpretation: Normal spirometry. No post bronchodilator response. Normal lung volumes. Normal in corrected diffusion capacity. Normal flow-volume loop Dale Rayo MD [facility]

## 2024-10-17 NOTE — TELEPHONE ENCOUNTER
Pts daughter said she does have some improvement post inj  She said she is feeling a little better

## 2024-10-29 ENCOUNTER — TELEPHONE (OUTPATIENT)
Age: 64
End: 2024-10-29

## 2024-10-29 DIAGNOSIS — K59.09 CHRONIC CONSTIPATION: Primary | ICD-10-CM

## 2024-10-29 NOTE — TELEPHONE ENCOUNTER
Patients GI provider: Sheila    Number to return call: [phone]    Reason for call: Pt calling to have the bowel prep called into her pharmacy on file. Please call in the [facility]     Scheduled procedure/appointment date if applicable: procedure 10/31/24

## 2024-10-30 ENCOUNTER — OFFICE VISIT (OUTPATIENT)
Dept: PAIN MEDICINE | Facility: CLINIC | Age: 64
End: 2024-10-30
Payer: COMMERCIAL

## 2024-10-30 ENCOUNTER — OFFICE VISIT (OUTPATIENT)
Dept: OBGYN CLINIC | Facility: CLINIC | Age: 64
End: 2024-10-30

## 2024-10-30 VITALS
HEART RATE: 68 BPM | BODY MASS INDEX: 33.59 KG/M2 | SYSTOLIC BLOOD PRESSURE: 101 MMHG | HEIGHT: 59 IN | DIASTOLIC BLOOD PRESSURE: 70 MMHG | WEIGHT: 166.6 LBS

## 2024-10-30 VITALS — HEIGHT: 59 IN | BODY MASS INDEX: 33.47 KG/M2 | WEIGHT: 166 LBS

## 2024-10-30 DIAGNOSIS — Z12.39 ENCOUNTER FOR BREAST CANCER SCREENING USING NON-MAMMOGRAM MODALITY: ICD-10-CM

## 2024-10-30 DIAGNOSIS — Z11.51 SCREENING FOR HPV (HUMAN PAPILLOMAVIRUS): ICD-10-CM

## 2024-10-30 DIAGNOSIS — Z01.419 ENCOUNTER FOR GYNECOLOGICAL EXAMINATION WITHOUT ABNORMAL FINDING: Primary | ICD-10-CM

## 2024-10-30 DIAGNOSIS — M54.16 LUMBAR RADICULITIS: Primary | ICD-10-CM

## 2024-10-30 DIAGNOSIS — Z12.4 SCREENING FOR CERVICAL CANCER: ICD-10-CM

## 2024-10-30 DIAGNOSIS — Z12.31 ENCOUNTER FOR SCREENING MAMMOGRAM FOR MALIGNANT NEOPLASM OF BREAST: ICD-10-CM

## 2024-10-30 PROCEDURE — 99386 PREV VISIT NEW AGE 40-64: CPT | Performed by: NURSE PRACTITIONER

## 2024-10-30 PROCEDURE — G0476 HPV COMBO ASSAY CA SCREEN: HCPCS | Performed by: NURSE PRACTITIONER

## 2024-10-30 PROCEDURE — 99214 OFFICE O/P EST MOD 30 MIN: CPT | Performed by: ANESTHESIOLOGY

## 2024-10-30 PROCEDURE — G0145 SCR C/V CYTO,THINLAYER,RESCR: HCPCS | Performed by: NURSE PRACTITIONER

## 2024-10-30 NOTE — PROGRESS NOTES
ANNUAL GYNECOLOGICAL EXAMINATION    Young Quiñonez is a 64 y.o. female who presents today for annual GYN exam.  She has never had a pap smear performed previously.  Her last mammogram was performed 5/15/2024 and result was WNL.  She had colon cancer screening performed 2024 via Cologuard which was WNL.  I have no documentation of HIV screening in her past.  She reports menses as absent since .  Her general medical history has been reviewed and she reports it as follows:    Past Medical History:   Diagnosis Date    Hyperlipidemia     Hypertension     Interstitial lung disease (HCC)      Past Surgical History:   Procedure Laterality Date    ECTOPIC PREGNANCY SURGERY       OB History          4    Para   3    Term   3       0    AB   1    Living   3         SAB   0    IAB   0    Ectopic   1    Multiple   0    Live Births   3               Social History     Tobacco Use    Smoking status: Some Days     Current packs/day: 1.00     Average packs/day: 1 pack/day for 12.8 years (12.8 ttl pk-yrs)     Types: Cigarettes     Start date:     Smokeless tobacco: Never   Vaping Use    Vaping status: Never Used   Substance Use Topics    Alcohol use: Yes     Comment: couple times/year    Drug use: Never     Social History     Substance and Sexual Activity   Sexual Activity Not Currently    Partners: Male    Birth control/protection: Post-menopausal     Cancer-related family history is negative for Breast cancer.    Current Outpatient Medications   Medication Instructions    bisoprolol (ZEBETA) 5 mg, Oral, Daily    ergocalciferol (VITAMIN D2) 50,000 Units, Oral, Weekly    fluticasone (FLONASE) 50 mcg/act nasal spray 2 sprays, Nasal, Daily    gabapentin (NEURONTIN) 100 mg capsule AS DIRECTED    lubiprostone (AMITIZA) 8 mcg, Oral, 2 times daily with meals    rosuvastatin (CRESTOR) 5 mg, Oral, Daily       Review of Systems:  Review of Systems   Constitutional: Negative.    Gastrointestinal: Negative.   "  Genitourinary:  Negative for difficulty urinating, pelvic pain, vaginal bleeding and vaginal discharge.   Skin: Negative.        Physical Exam:  /70 (BP Location: Left arm, Patient Position: Sitting, Cuff Size: Standard)   Pulse 68   Ht 4' 11\" (1.499 m)   Wt 75.6 kg (166 lb 9.6 oz)   BMI 33.65 kg/m²   Physical Exam  Constitutional:       General: She is not in acute distress.     Appearance: She is well-developed.   Genitourinary:      Vulva normal.      No lesions in the vagina.      Moderate vaginal atrophy present.       Right Adnexa: not tender and no mass present.     Left Adnexa: not tender and no mass present.     No cervical motion tenderness or lesion.      Uterus is not tender.   Breasts:     Right: No mass, nipple discharge, skin change or tenderness.      Left: No mass, nipple discharge, skin change or tenderness.   Neck:      Thyroid: No thyromegaly.   Cardiovascular:      Rate and Rhythm: Normal rate and regular rhythm.   Pulmonary:      Effort: Pulmonary effort is normal.   Abdominal:      Palpations: Abdomen is soft.      Tenderness: There is no abdominal tenderness.   Musculoskeletal:      Cervical back: Neck supple.   Neurological:      Mental Status: She is alert and oriented to person, place, and time.   Skin:     General: Skin is warm and dry.   Vitals reviewed.       Assessment/Plan:   1. Normal well-woman GYN exam.  2. Cervical cancer screening:  Normal cervical exam.  Pap smear done with HPV co-testing.  Has not received HPV vaccine in the past and she is beyond the age of recommended administration.   3. STD screening:  Patient declines.   4. Breast cancer screening:  Normal breast exam.  Order placed for bilateral screening mammogram.  Reviewed breast self-awareness.   5. Colon cancer screening:  Up to date.   6. Depression Screening: Patient's depression screening was assessed with a PHQ-2 score of 0. Clinically patient does not have depression. No treatment is required.   7. " BMI Counseling: Body mass index is 33.65 kg/m². Discussed the patient's BMI with her. The BMI is above normal. Nutrition recommendations include reducing portion sizes and decreasing overall calorie intake.   8. Tobacco Cessation Counseling: Tobacco cessation counseling and education was provided. The patient is sincerely urged to quit consumption of tobacco. She is not ready to quit tobacco. The numerous health risks of tobacco consumption were discussed. If she decides to quit, there are a number of helpful adjunctive aids, and she can see me to discuss nicotine replacement therapy, chantix, or bupropion anytime in the future.   9. Return to office in 1 year for annual GYN exam.    Reviewed with patient that [facility], but that they will not necessarily be reviewed by me immediately.  Explained that I will review results at my earliest opportunity and contact patient appropriately.

## 2024-10-30 NOTE — LETTER
2024    To Young Quiñonez  : 1960      This letter is to advise you that your recent PAP SMEAR results were reviewed by me and are NORMAL.  We will see you in 1 year for your annual exam.    TORRI Andrews

## 2024-10-30 NOTE — PATIENT INSTRUCTIONS
Thank you for your confidence in our team.   We appreciate you and welcome your feedback.   If you receive a survey from us, please take a few moments to let us know how we are doing.   Sincerely,  TORRI Andrews       OBESITY     Obesity is defined as a body mass index (BMI) which is greater than 30. Your Body mass index is 33.65 kg/m²..    The risks of obesity include  many health problems, such as injuries or physical disability. You may need tests to check for the following:  Diabetes     High blood pressure or high cholesterol     Heart disease     Gallbladder or liver disease     Cancer of the colon, breast, prostate, liver, or kidney     Sleep apnea     Arthritis or gout    Seek care immediately if:   You have a severe headache, confusion, or difficulty speaking.     You have weakness on one side of your body.     You have chest pain, sweating, or shortness of breath.    Contact your healthcare provider if:   You have symptoms of gallbladder or liver disease, such as pain in your upper abdomen.    You have knee or hip pain and discomfort while walking.     You have symptoms of diabetes, such as intense hunger and thirst, and frequent urination.     You have symptoms of sleep apnea, such as snoring or daytime sleepiness.     You have questions or concerns about your condition or care.    Treatment for obesity  focuses on helping you lose weight to improve your health. Even a small decrease in BMI can reduce the risk for many health problems. Your healthcare provider will help you set a weight-loss goal.  Lifestyle changes  are the first step in treating obesity. These include making healthy food choices and getting regular physical activity. Your healthcare provider may suggest a weight-loss program that involves coaching, education, and therapy.     Medicine  may help you lose weight when it is used with a healthy diet and physical activity.     Surgery  can help you lose weight if you are very  obese and have other health problems. There are several types of weight-loss surgery. Ask your healthcare provider for more information.    Be successful losing weight:   Set small, realistic goals.  An example of a small goal is to walk for 20 minutes 5 days a week. Anther goal is to lose 5% of your body weight.    Tell friends, family members, and coworkers about your goals  and ask for their support. Ask a friend to lose weight with you, or join a weight-loss support group.    Identify foods or triggers that may cause you to overeat , and find ways to avoid them. Remove tempting high-calorie foods from your home and workplace. Place a bowl of fresh fruit on your kitchen counter. If stress causes you to eat, then find other ways to cope with stress.     Keep a diary to track what you eat and drink.  Also write down how many minutes of physical activity you do each day. Weigh yourself once a week and record it in your diary.    Eating changes:  You will need to eat 500 to 1,000 fewer calories each day than you currently eat to lose 1 to 2 pounds a week. The following changes will help you cut calories:  Eat smaller portions.  Use small plates, no larger than 9 inches in diameter. Fill your plate half full of fruits and vegetables. Measure your food using measuring cups until you know what a serving size looks like.     Eat 3 meals and 1 or 2 snacks each day.  Plan your meals in advance. Cook and eat at home most of the time. Eat slowly.     Eat fruits and vegetables at every meal.  They are low in calories and high in fiber, which makes you feel full. Do not add butter, margarine, or cream sauce to vegetables. Use herbs to season steamed vegetables.     Eat less fat and fewer fried foods.  Eat more baked or grilled chicken and fish. These protein sources are lower in calories and fat than red meat. Limit fast food. Dress your salads with olive oil and vinegar instead of bottled dressing.     Limit the amount of  sugar you eat.  Do not drink sugary beverages. Limit alcohol.    Activity changes:  Physical activity is good for your body in many ways. It helps you burn calories and build strong muscles. It decreases stress and depression, and improves your mood. It can also help you sleep better. Talk to your healthcare provider before you begin an exercise program.  Exercise for at least 30 minutes 5 days a week.  Start slowly. Set aside time each day for physical activity that you enjoy and that is convenient for you. It is best to do both weight training and an activity that increases your heart rate, such as walking, bicycling, or swimming.     Find ways to be more active.  Do yard work and housecleaning. Walk up the stairs instead of using elevators. Spend your leisure time going to events that require walking, such as outdoor festivals or fairs. This extra physical activity can help you lose weight and keep it off.    Follow up with your primary healthcare provider as directed.  You may need to meet with a dietitian. Write down your questions so you remember to ask them during your visits.        Cigarette Smoking and Your Health     What are the risks to my health if I smoke tobacco?  Nicotine and other chemicals found in tobacco damage every cell in your body. Even if you are a light smoker, you have an increased risk for cancer, heart disease, and lung disease. If you are pregnant or have diabetes, smoking increases your risk for complications.     What are the benefits to my health if I stop smoking?   You decrease respiratory symptoms such as coughing, wheezing, and shortness of breath.     You reduce your risk for cancers of the lung, mouth, throat, kidney, bladder, pancreas, stomach, and cervix. If you already have cancer, you increase the benefits of chemotherapy. You also reduce your risk for cancer returning or a second cancer from developing.     You reduce your risk for heart disease, blood clots, heart  attack, and stroke.     You reduce your risk for lung infections, and diseases such as pneumonia, asthma, chronic bronchitis, and emphysema.    Your circulation improves. More oxygen can be delivered to your body. If you have diabetes, you lower your risk for complications, such as kidney, artery, and eye diseases. You also lower your risk for nerve damage. Nerve damage can lead to amputations, poor vision, and blindness.    You improve your body's ability to heal and to fight infections.    What are the health benefits to others if I stop smoking?  Tobacco is harmful to nonsmokers who breathe in your secondhand smoke. The following are ways the health of others around you may improve when you stop smoking:  You lower the risks for lung cancer and heart disease in nonsmoking adults.     If you are pregnant, you lower the risk for miscarriage, early delivery, low birth weight, and stillbirth. You also lower your baby's risk for SIDS, obesity, developmental delay, and neurobehavioral problems, such as ADHD.     If you have children, you lower their risk for ear infections, colds, pneumonia, bronchitis, and asthma.    How to Stop Smoking     You will improve your health and the health of others around you  if you stop smoking. Your risk for heart and lung disease, cancer, stroke, heart attack, and vision problems will also decrease. You can benefit from quitting no matter how long you have smoked.  PREPARE to stop smoking.  Nicotine is a highly addictive drug found in cigarettes. Withdrawal symptoms can happen when you stop smoking and make it hard to quit. These include anxiety, depression, irritability, trouble sleeping, and increased appetite. You increase your chances of success if you PREPARE to quit.    Set a quit date.  Pick a date that is within the next 2 weeks. Do not pick a day that you think may be stressful or busy. Write down the day or [place] it on your calender.     Tell friends and family that you plan  to quit.  Explain that you may have withdrawal symptoms when you try to quit. Ask them to support you. They may be able to encourage you and help reduce your stress to make it easier for you to quit.    Make a list of your reasons for quitting.  Put the list somewhere you will see it every day, such as your refrigerator. You can look at the list when you have a craving.     Remove all tobacco and nicotine products from your home, car, and workplace.  Also, remove anything else that will tempt you to smoke, such as lighters, matches, or ashtrays. Clean your car, home, and places at work that smell like smoke. The smell of smoke can trigger a craving.     Identify triggers that make you want to smoke.  This may include activities, feelings, or people. Also write down 1 way you can deal with each of your triggers. For example, if you want to smoke as soon as you wake up, plan another activity during this time, such as exercise.     Make a plan for how you will quit.  Learn about the tools that can help you quit, such as medicine, counseling, or nicotine replacement therapy. Choose at least 2 options to help you quit.      Tools to help you stop smoking:     Counseling  from a trained healthcare provider can provide you with support and skills to quit smoking. The provider will also teach you to manage your withdrawal symptoms and cravings. You may receive counseling from one counselor, in group therapy, or through phone therapy called a quit line.     Nicotine replacement therapy (NRT)  such as nicotine patches, gum, or lozenges may help reduce your nicotine cravings. You may get these without a doctor's order. Do not use e-cigarettes or smokeless tobacco in place of cigarettes or to help you quit. They still contain nicotine.    Prescription medicines  such as nasal sprays or nicotine inhalers may help reduce your withdrawal symptoms. Other medicines may also be used to reduce your urge to smoke. Ask your healthcare  provider about these medicines. You may need to start certain medicines 2 weeks before your quit date for them to work well.     Hypnosis  is a practice that helps guide you through thoughts and feelings. Hypnosis may help decrease your cravings and make you more willing to quit.     Acupuncture therapy  uses very thin needles to balance energy channels in the body. This is thought to help decrease cravings and symptoms of nicotine withdrawal.     Support groups  let you talk to others who are trying to quit or have already quit. It may be helpful to speak with others about how they quit.      Manage your cravings:     Avoid situations, people, and places that tempt you to smoke.  Go to nonsmoking places, such as libraries or restaurants. Understand what tempts you and try to avoid these things.    Keep your hands busy.  Hold things such as a stress ball or pen.     Put candy or toothpicks in your mouth.  Keep lollipops, sugarless gum, or toothpicks with you at all times.     Do not have alcohol or caffeine.  These drinks may tempt you to smoke. Drink healthy liquids such as water or juice instead.    Reward yourself when you resist your cravings.  Rewards will motivate you and help you stay positive.     Do an activity that distracts you from your craving.  Examples include going for a walk, exercising, or cleaning.      Prevent weight gain after you quit:  You may gain a few pounds after you quit smoking. It is healthier for you to gain a few pounds than to continue to smoke. The following can help you prevent weight gain:    Eat healthy foods.  These include fruits, vegetables, whole-grain breads, low-fat dairy products, beans, lean meats, and fish. Eat healthy snacks, such as low-fat yogurt, if you get hungry between meals.     Drink water before, during, and between meals.  This will make your stomach feel full and help prevent you from overeating. Ask your healthcare provider how much liquid to drink each day  and which liquids are best for you.    Exercise.  Take a walk or do some kind of exercise every day. Ask your healthcare provider what exercise is right for you. This may help reduce your cravings and reduce stress.

## 2024-10-30 NOTE — PROGRESS NOTES
Assessment:  1. Lumbar radiculitis      Patient presents with daughter today who provided Nepali interpretation as well as acting as an independent historian.     Patient presenting for follow-up visit.  She has a history of chronic back with right radicular leg pain for greater than 1 year.    Pain is consistent with lumbar radicular pain, lumbar spinal stenosis accompanied by pain at times  >7/10 on the pain scale with inability to participate in IADLs for >6 weeks.      Patient has participated with chiropractic therapy [place]. She states she is fearful of trying chiropractic or physical therapy again as it worsened her symptoms.      Patient has tried gabapentin with modest benefit.       Independently reviewed and interpreted external lumbar MRI. This showed mild to moderate spinal stenosis from L3-S1 with vary degrees of foraminal stenosis.    After L4-5 LESI performed on 10/3/2024, the patient again notes ongoing 50% improvement in her pain symptoms and level of function.      Plan:     Repeat L4-5 LESI's providing ongoing 50% pain improvement.  Discussed repeat L4-5 LESI when symptoms return to a significant degree.    Patient and daughter do believe she will require another LESI around the 3-month zoë.  When they contact the office with significant symptoms, we will schedule for repeat right L4-5 LESI.     Reviewed external notes from family medicine office in regards to recent and prior relevant medical histories, treatment recommendations, medication and/or interventional treatment responses.     Reviewed hemoglobin A1c, renal function, CBC and/or PT/INR prior to discussing/offering interventional modalities.    My impressions and treatment recommendations were discussed in detail with the patient who verbalized understanding and had no further questions.  Discharge instructions were provided. I personally saw and examined the patient and I agree with the above discussed plan of  care.    History of Present Illness:  Young Quiñonez is a 64 y.o. female who presents for a follow up office visit in regards to Back Pain.   The patient’s current symptoms include improved back and right radiating leg pain symptoms. Pain is rated 3/10 on the pain scale currently.  Pain symptoms are described as an intermittent cramping, pressure-like pain worse at night.    I have personally reviewed and/or updated the patient's past medical history, past surgical history, family history, social history, current medications, allergies, and vital signs today.     Review of Systems   Constitutional:  Negative for chills and fever.   HENT:  Negative for ear pain and sore throat.    Eyes:  Negative for pain and visual disturbance.   Respiratory:  Negative for cough and shortness of breath.    Cardiovascular:  Negative for chest pain and palpitations.   Gastrointestinal:  Negative for abdominal pain and vomiting.   Genitourinary:  Negative for dysuria and hematuria.   Musculoskeletal:  Positive for back pain, gait problem and myalgias. Negative for arthralgias.   Skin:  Negative for color change and rash.   Neurological:  Positive for weakness. Negative for seizures and syncope.   All other systems reviewed and are negative.      Patient Active Problem List   Diagnosis    Lumbar radiculopathy    Interstitial lung disease (HCC)       Past Medical History:   Diagnosis Date    Hyperlipidemia     Hypertension     Interstitial lung disease (HCC)        Past Surgical History:   Procedure Laterality Date    ECTOPIC PREGNANCY SURGERY  1994       Family History   Problem Relation Age of Onset    Hypertension Mother     Hypertension Father     Cancer Brother         lung    Lung cancer Brother     Breast cancer Neg Hx     Colon cancer Neg Hx     Ovarian cancer Neg Hx        Social History     Occupational History    Not on file   Tobacco Use    Smoking status: Some Days     Current packs/day: 1.00     Average packs/day: 1 pack/day  "for 12.8 years (12.8 ttl pk-yrs)     Types: Cigarettes     Start date: 2012    Smokeless tobacco: Never   Vaping Use    Vaping status: Never Used   Substance and Sexual Activity    Alcohol use: Yes     Comment: couple times/year    Drug use: Never    Sexual activity: Not Currently     Partners: Male     Birth control/protection: Post-menopausal       Current Outpatient Medications on File Prior to Visit   Medication Sig    bisoprolol (ZEBETA) 5 mg tablet Take 1 tablet (5 mg total) by mouth daily    ergocalciferol (VITAMIN D2) 50,000 units Take 1 capsule (50,000 Units total) by mouth once a week    fluticasone (FLONASE) 50 mcg/act nasal spray 2 sprays into each nostril daily    gabapentin (NEURONTIN) 100 mg capsule AS DIRECTED    lubiprostone (AMITIZA) 8 mcg capsule Take 1 capsule (8 mcg total) by mouth 2 (two) times a day with meals    rosuvastatin (CRESTOR) 5 mg tablet Take 1 tablet (5 mg total) by mouth daily    [DISCONTINUED] polyethylene glycol (GOLYTELY) 4000 mL solution Take 4,000 mL by mouth once for 1 dose    [DISCONTINUED] valsartan (DIOVAN) 80 mg tablet Take 1 tablet (80 mg total) by mouth daily (Patient not taking: Reported on 9/27/2024)     No current facility-administered medications on file prior to visit.       No Known Allergies    Physical Exam:    Ht 4' 11\" (1.499 m)   Wt 75.3 kg (166 lb)   BMI 33.53 kg/m²     Constitutional:normal, well developed, well nourished, alert, in no distress and non-toxic and no overt pain behavior.  Eyes:anicteric  HEENT:grossly intact  Neck:supple, symmetric, trachea midline and no masses   Pulmonary:even and unlabored  Cardiovascular:No edema or pitting edema present  Skin:Normal without rashes or lesions and well hydrated  Psychiatric:Mood and affect appropriate  Neurologic: Motor function is grossly intact with no focal neurologic deficits   Musculoskeletal: Gait is nonantalgic.    Imaging    "

## 2024-10-31 ENCOUNTER — HOSPITAL ENCOUNTER (OUTPATIENT)
Dept: GASTROENTEROLOGY | Facility: HOSPITAL | Age: 64
Setting detail: OUTPATIENT SURGERY
End: 2024-10-31
Payer: COMMERCIAL

## 2024-10-31 ENCOUNTER — ANESTHESIA (OUTPATIENT)
Dept: GASTROENTEROLOGY | Facility: HOSPITAL | Age: 64
End: 2024-10-31
Payer: COMMERCIAL

## 2024-10-31 ENCOUNTER — ANESTHESIA EVENT (OUTPATIENT)
Dept: GASTROENTEROLOGY | Facility: HOSPITAL | Age: 64
End: 2024-10-31
Payer: COMMERCIAL

## 2024-10-31 VITALS
HEART RATE: 77 BPM | DIASTOLIC BLOOD PRESSURE: 79 MMHG | OXYGEN SATURATION: 98 % | BODY MASS INDEX: 33.47 KG/M2 | HEIGHT: 59 IN | WEIGHT: 166 LBS | RESPIRATION RATE: 15 BRPM | TEMPERATURE: 98 F | SYSTOLIC BLOOD PRESSURE: 118 MMHG

## 2024-10-31 DIAGNOSIS — K59.09 CHRONIC CONSTIPATION: ICD-10-CM

## 2024-10-31 LAB
HPV HR 12 DNA CVX QL NAA+PROBE: NEGATIVE
HPV16 DNA CVX QL NAA+PROBE: NEGATIVE
HPV18 DNA CVX QL NAA+PROBE: NEGATIVE

## 2024-10-31 PROCEDURE — 88305 TISSUE EXAM BY PATHOLOGIST: CPT | Performed by: PATHOLOGY

## 2024-10-31 PROCEDURE — 45385 COLONOSCOPY W/LESION REMOVAL: CPT | Performed by: INTERNAL MEDICINE

## 2024-10-31 RX ORDER — SODIUM CHLORIDE, SODIUM LACTATE, POTASSIUM CHLORIDE, CALCIUM CHLORIDE 600; 310; 30; 20 MG/100ML; MG/100ML; MG/100ML; MG/100ML
INJECTION, SOLUTION INTRAVENOUS CONTINUOUS PRN
Status: DISCONTINUED | OUTPATIENT
Start: 2024-10-31 | End: 2024-10-31

## 2024-10-31 RX ORDER — PROPOFOL 10 MG/ML
INJECTION, EMULSION INTRAVENOUS AS NEEDED
Status: DISCONTINUED | OUTPATIENT
Start: 2024-10-31 | End: 2024-10-31

## 2024-10-31 RX ADMIN — SODIUM CHLORIDE, SODIUM LACTATE, POTASSIUM CHLORIDE, AND CALCIUM CHLORIDE: .6; .31; .03; .02 INJECTION, SOLUTION INTRAVENOUS at 10:15

## 2024-10-31 RX ADMIN — PROPOFOL 100 MG: 10 INJECTION, EMULSION INTRAVENOUS at 11:02

## 2024-10-31 RX ADMIN — SODIUM CHLORIDE, SODIUM LACTATE, POTASSIUM CHLORIDE, AND CALCIUM CHLORIDE: .6; .31; .03; .02 INJECTION, SOLUTION INTRAVENOUS at 11:23

## 2024-10-31 RX ADMIN — PROPOFOL 50 MG: 10 INJECTION, EMULSION INTRAVENOUS at 11:11

## 2024-10-31 RX ADMIN — PROPOFOL 100 MG: 10 INJECTION, EMULSION INTRAVENOUS at 11:07

## 2024-10-31 RX ADMIN — PROPOFOL 50 MG: 10 INJECTION, EMULSION INTRAVENOUS at 11:17

## 2024-10-31 NOTE — ANESTHESIA POSTPROCEDURE EVALUATION
Post-Op Assessment Note    CV Status:  Stable  Pain Score: 0    Pain management: adequate       Mental Status:  Alert and awake   Hydration Status:  Euvolemic   PONV Controlled:  Controlled   Airway Patency:  Patent     Post Op Vitals Reviewed: Yes    No anethesia notable event occurred.    Staff: CRNA           Last Filed PACU Vitals:  Vitals Value Taken Time   Temp 98 1124   Pulse 68    /63    Resp 16    SpO2 99        Modified Maritza:  Activity: 2 (10/31/2024 10:23 AM)  Respiration: 2 (10/31/2024 10:23 AM)  Circulation: 2 (10/31/2024 10:23 AM)  Consciousness: 2 (10/31/2024 10:23 AM)  Oxygen Saturation: 2 (10/31/2024 10:23 AM)  Modified Maritza Score: 10 (10/31/2024 10:23 AM)

## 2024-10-31 NOTE — ANESTHESIA PREPROCEDURE EVALUATION
Procedure:  COLONOSCOPY    Relevant Problems   No relevant active problems        Physical Exam    Airway    Mallampati score: II  TM Distance: >3 FB  Neck ROM: full     Dental   No notable dental hx     Cardiovascular  Rhythm: regular, Rate: normal, Cardiovascular exam normal    Pulmonary  Pulmonary exam normal     Other Findings  Hypertension, hyperlipidemia, and constipation post-pubertal.      Anesthesia Plan  ASA Score- 2     Anesthesia Type- IV sedation with anesthesia with ASA Monitors.         Additional Monitors:     Airway Plan:            Plan Factors-Exercise tolerance (METS): >4 METS.    Chart reviewed. EKG reviewed. Imaging results reviewed. Existing labs reviewed. Patient summary reviewed.    Patient is not a current smoker.  Patient did not smoke on day of surgery.            Induction- intravenous.    Postoperative Plan-     Perioperative Resuscitation Plan - Level 1 - Full Code.       Informed Consent- Anesthetic plan and risks discussed with patient.  I personally reviewed this patient with the CRNA. Discussed and agreed on the Anesthesia Plan with the CRNA..

## 2024-10-31 NOTE — H&P
History and Physical -  [facility]  Young Quiñonez 64 y.o. female MRN: [id number]                  HPI: Young Quiñonez is a 64 y.o. year old female who presents for colonoscopy due to history of constipation.      REVIEW OF SYSTEMS: Per the HPI, and otherwise unremarkable.    Historical Information   Past Medical History:   Diagnosis Date    Hyperlipidemia     Hypertension     Interstitial lung disease (HCC)      Past Surgical History:   Procedure Laterality Date    ECTOPIC PREGNANCY SURGERY  1994     Social History   Social History     Substance and Sexual Activity   Alcohol Use Yes    Comment: couple times/year     Social History     Substance and Sexual Activity   Drug Use Never     Social History     Tobacco Use   Smoking Status Some Days    Current packs/day: 1.00    Average packs/day: 1 pack/day for 12.8 years (12.8 ttl pk-yrs)    Types: Cigarettes    Start date: 2012   Smokeless Tobacco Never     Family History   Problem Relation Age of Onset    Hypertension Mother     Hypertension Father     Cancer Brother         lung    Lung cancer Brother     Breast cancer Neg Hx     Colon cancer Neg Hx     Ovarian cancer Neg Hx        Meds/Allergies     Not in a hospital admission.    No Known Allergies    Objective     There were no vitals taken for this visit.      PHYSICAL EXAM    Gen: NAD  CV: RRR  CHEST: Clear  ABD: soft, NT/ND  EXT: no edema      ASSESSMENT/PLAN:  Young Quiñonez is a 64 y.o. year old female who presents for colonoscopy due to history of constipation. The patient is stable and optimized for the procedure, we reviewed risk and benefits. Risk include but not limited to infection, bleeding, perforation and missing a lesion.

## 2024-11-05 PROCEDURE — 88305 TISSUE EXAM BY PATHOLOGIST: CPT | Performed by: PATHOLOGY

## 2024-11-06 LAB
LAB AP GYN PRIMARY INTERPRETATION: NORMAL
Lab: NORMAL

## 2024-11-13 ENCOUNTER — TELEPHONE (OUTPATIENT)
Dept: GASTROENTEROLOGY | Facility: CLINIC | Age: 64
End: 2024-11-13

## 2024-11-13 NOTE — TELEPHONE ENCOUNTER
Left message with full details and if they have any questions to feel free to call me back.Recall order has been entered.      There was 1 polyp removed during colonoscopy which was benign.  Repeat colonoscopy will be due in 3 years.   Written by Gonzalo Ko MD on 11/6/2024 10:45 PM EST

## 2024-12-10 ENCOUNTER — TELEPHONE (OUTPATIENT)
Age: 64
End: 2024-12-10

## 2024-12-10 NOTE — TELEPHONE ENCOUNTER
Caller: Chapo ( pt daughter)    Doctor: Yakelin    Reason for call: pt daughter calling to schedule pt an injection.Pt daughter stated pt could have an injection every 3 months. Please Advise     Call back#: [phone]

## 2024-12-11 NOTE — TELEPHONE ENCOUNTER
[facility] and [facility].    Pt would like repeat injection. Daughter Chapo is on Medical communication consent.

## 2024-12-13 NOTE — TELEPHONE ENCOUNTER
Please see previous responses and advise. Pt is requesting to repeat L4-5 LESI at 3 month zoë   intact

## 2024-12-13 NOTE — TELEPHONE ENCOUNTER
Caller: patient daughter Chapo    Doctor: Yakelin    Reason for call: Calling back    Call back#:

## 2024-12-13 NOTE — TELEPHONE ENCOUNTER
Repeat Injections  Injection type:L4-5 LESI  Last injection date:10/3/24  Last office visit: 10/30/24  Where is pain located: Marcelo lower back and RT lower extremity   Is the pain the same area as before: Yes  Current pain level: 7/10  How much % of relief did the last injection provide:70%  Duration of relief from last injection: Approximately 2 months  When did pain return:Approximately 2 weeks ago  Is the pain effecting your ADLs: Yes    Blood thinners/NSAIDS? Pt takes 81 mg Aspirin    Diabetes? No

## 2024-12-17 NOTE — TELEPHONE ENCOUNTER
Patient is scheduled for 1/9    Reviewed instructions: , NPO 1 hour prior, loose-fitting/comfortable pants, if ill/fever/infx/abx to call and reschedule. No immunizations or vaccinations 2w prior/after steroid injections.      Patient stated verbal understanding.

## 2024-12-18 ENCOUNTER — OFFICE VISIT (OUTPATIENT)
Dept: FAMILY MEDICINE CLINIC | Facility: CLINIC | Age: 64
End: 2024-12-18
Payer: COMMERCIAL

## 2024-12-18 ENCOUNTER — APPOINTMENT (OUTPATIENT)
Dept: LAB | Age: 64
End: 2024-12-18
Payer: COMMERCIAL

## 2024-12-18 VITALS
WEIGHT: 168 LBS | HEART RATE: 62 BPM | OXYGEN SATURATION: 97 % | RESPIRATION RATE: 16 BRPM | DIASTOLIC BLOOD PRESSURE: 80 MMHG | HEIGHT: 59 IN | SYSTOLIC BLOOD PRESSURE: 136 MMHG | BODY MASS INDEX: 33.87 KG/M2 | TEMPERATURE: 97.8 F

## 2024-12-18 DIAGNOSIS — E78.49 OTHER HYPERLIPIDEMIA: ICD-10-CM

## 2024-12-18 DIAGNOSIS — K58.1 IRRITABLE BOWEL SYNDROME WITH CONSTIPATION: Primary | ICD-10-CM

## 2024-12-18 DIAGNOSIS — J84.9 INTERSTITIAL LUNG DISEASE (HCC): ICD-10-CM

## 2024-12-18 DIAGNOSIS — Z11.59 NEED FOR HEPATITIS C SCREENING TEST: ICD-10-CM

## 2024-12-18 DIAGNOSIS — I10 PRIMARY HYPERTENSION: ICD-10-CM

## 2024-12-18 DIAGNOSIS — K21.9 GASTROESOPHAGEAL REFLUX DISEASE WITHOUT ESOPHAGITIS: ICD-10-CM

## 2024-12-18 DIAGNOSIS — E04.1 THYROID NODULE: ICD-10-CM

## 2024-12-18 DIAGNOSIS — Z00.01 ENCOUNTER FOR GENERAL ADULT MEDICAL EXAMINATION WITH ABNORMAL FINDINGS: ICD-10-CM

## 2024-12-18 DIAGNOSIS — Z11.4 SCREENING FOR HIV (HUMAN IMMUNODEFICIENCY VIRUS): ICD-10-CM

## 2024-12-18 DIAGNOSIS — K58.1 IRRITABLE BOWEL SYNDROME WITH CONSTIPATION: ICD-10-CM

## 2024-12-18 DIAGNOSIS — E55.9 VITAMIN D DEFICIENCY: ICD-10-CM

## 2024-12-18 DIAGNOSIS — M54.16 LUMBAR RADICULOPATHY: ICD-10-CM

## 2024-12-18 LAB
ALBUMIN SERPL BCG-MCNC: 4 G/DL (ref 3.5–5)
ALP SERPL-CCNC: 51 U/L (ref 34–104)
ALT SERPL W P-5'-P-CCNC: 12 U/L (ref 7–52)
ANION GAP SERPL CALCULATED.3IONS-SCNC: 9 MMOL/L (ref 4–13)
AST SERPL W P-5'-P-CCNC: 23 U/L (ref 13–39)
BACTERIA UR QL AUTO: ABNORMAL /HPF
BILIRUB SERPL-MCNC: 0.45 MG/DL (ref 0.2–1)
BILIRUB UR QL STRIP: NEGATIVE
BUN SERPL-MCNC: 19 MG/DL (ref 5–25)
CALCIUM SERPL-MCNC: 9.5 MG/DL (ref 8.4–10.2)
CHLORIDE SERPL-SCNC: 105 MMOL/L (ref 96–108)
CLARITY UR: CLEAR
CO2 SERPL-SCNC: 27 MMOL/L (ref 21–32)
COLOR UR: ABNORMAL
CREAT SERPL-MCNC: 0.5 MG/DL (ref 0.6–1.3)
GFR SERPL CREATININE-BSD FRML MDRD: 104 ML/MIN/1.73SQ M
GLUCOSE P FAST SERPL-MCNC: 102 MG/DL (ref 65–99)
GLUCOSE UR STRIP-MCNC: NEGATIVE MG/DL
HCV AB SER QL: NORMAL
HGB UR QL STRIP.AUTO: NEGATIVE
HIV 1+2 AB+HIV1 P24 AG SERPL QL IA: NORMAL
HIV 2 AB SERPL QL IA: NORMAL
HIV1 AB SERPL QL IA: NORMAL
HIV1 P24 AG SERPL QL IA: NORMAL
KETONES UR STRIP-MCNC: NEGATIVE MG/DL
LEUKOCYTE ESTERASE UR QL STRIP: ABNORMAL
MAGNESIUM SERPL-MCNC: 1.9 MG/DL (ref 1.9–2.7)
MUCOUS THREADS UR QL AUTO: ABNORMAL
NITRITE UR QL STRIP: NEGATIVE
NON-SQ EPI CELLS URNS QL MICRO: ABNORMAL /HPF
PH UR STRIP.AUTO: 5.5 [PH]
POTASSIUM SERPL-SCNC: 3.9 MMOL/L (ref 3.5–5.3)
PROT SERPL-MCNC: 6.9 G/DL (ref 6.4–8.4)
PROT UR STRIP-MCNC: ABNORMAL MG/DL
RBC #/AREA URNS AUTO: ABNORMAL /HPF
SODIUM SERPL-SCNC: 141 MMOL/L (ref 135–147)
SP GR UR STRIP.AUTO: 1.01 (ref 1–1.03)
UROBILINOGEN UR STRIP-ACNC: <2 MG/DL
WBC #/AREA URNS AUTO: ABNORMAL /HPF

## 2024-12-18 PROCEDURE — 86803 HEPATITIS C AB TEST: CPT

## 2024-12-18 PROCEDURE — 81001 URINALYSIS AUTO W/SCOPE: CPT

## 2024-12-18 PROCEDURE — 36415 COLL VENOUS BLD VENIPUNCTURE: CPT

## 2024-12-18 PROCEDURE — 87389 HIV-1 AG W/HIV-1&-2 AB AG IA: CPT

## 2024-12-18 PROCEDURE — 83735 ASSAY OF MAGNESIUM: CPT

## 2024-12-18 PROCEDURE — 80053 COMPREHEN METABOLIC PANEL: CPT

## 2024-12-18 PROCEDURE — 99214 OFFICE O/P EST MOD 30 MIN: CPT | Performed by: INTERNAL MEDICINE

## 2024-12-18 RX ORDER — BISOPROLOL FUMARATE 5 MG/1
5 TABLET, FILM COATED ORAL DAILY
Qty: 90 TABLET | Refills: 3 | Status: SHIPPED | OUTPATIENT
Start: 2024-12-18

## 2024-12-18 RX ORDER — ERGOCALCIFEROL 1.25 MG/1
50000 CAPSULE, LIQUID FILLED ORAL WEEKLY
Qty: 12 CAPSULE | Refills: 2 | Status: SHIPPED | OUTPATIENT
Start: 2024-12-18

## 2024-12-18 RX ORDER — ROSUVASTATIN CALCIUM 5 MG/1
5 TABLET, COATED ORAL DAILY
Qty: 90 TABLET | Refills: 3 | Status: SHIPPED | OUTPATIENT
Start: 2024-12-18

## 2024-12-18 NOTE — PROGRESS NOTES
Name: Young Quiñonez      : 1960      MRN: [id number]  Encounter Provider: Parveen Sosa MD  Encounter Date: 2024   Encounter department: [facility]  :  Assessment & Plan  Primary hypertension    Orders:    bisoprolol (ZEBETA) 5 mg tablet; Take 1 tablet (5 mg total) by mouth daily    linaCLOtide 72 MCG CAPS; Take 72 mcg by mouth daily at least 30 minutes prior to the first meal of the day    UA (URINE) with reflex to Scope; Future    Magnesium; Future    CBC and differential; Future    Comprehensive metabolic panel; Future    Lipid panel; Future    TSH, 3rd generation; Future    T4, free; Future    Vitamin D deficiency    Orders:    ergocalciferol (VITAMIN D2) 50,000 units; Take 1 capsule (50,000 Units total) by mouth once a week    Other hyperlipidemia    Orders:    rosuvastatin (CRESTOR) 5 mg tablet; Take 1 tablet (5 mg total) by mouth daily    linaCLOtide 72 MCG CAPS; Take 72 mcg by mouth daily at least 30 minutes prior to the first meal of the day    UA (URINE) with reflex to Scope; Future    Magnesium; Future    CBC and differential; Future    Comprehensive metabolic panel; Future    Lipid panel; Future    TSH, 3rd generation; Future    T4, free; Future    Irritable bowel syndrome with constipation    Orders:    linaCLOtide 72 MCG CAPS; Take 72 mcg by mouth daily at least 30 minutes prior to the first meal of the day    UA (URINE) with reflex to Scope; Future    Magnesium; Future    CBC and differential; Future    Comprehensive metabolic panel; Future    Lipid panel; Future    TSH, 3rd generation; Future    T4, free; Future    Lumbar radiculopathy    Orders:    Ambulatory Referral to Neurosurgery; Future    BMI 33.0-33.9,adult    Orders:    Semaglutide-Weight Management (WEGOVY) 0.25 MG/0.5ML; Inject 0.5 mL (0.25 mg total) under the skin once a week    Interstitial lung disease (HCC)  Pt was advised to quit smoking  Yearly Ct Chest and PFTs.       Life style  "Mod  RTC in 3 mos w Blood work         History of Present Illness     64 Y O Lady is here for Regular check up, she still has Lumbar radiculopathy symptoms, recent blood work and med list reviewed,...      Review of Systems   Constitutional:  Negative for chills, fatigue and fever.   HENT:  Negative for congestion, ear pain, facial swelling, sore throat, trouble swallowing and voice change.    Eyes:  Negative for pain, discharge and visual disturbance.   Respiratory:  Negative for cough, shortness of breath and wheezing.    Cardiovascular:  Negative for chest pain, palpitations and leg swelling.   Gastrointestinal:  Negative for abdominal pain, blood in stool, constipation, diarrhea, nausea and vomiting.   Endocrine: Negative for polydipsia, polyphagia and polyuria.   Genitourinary:  Negative for difficulty urinating, dysuria, hematuria and urgency.   Musculoskeletal:  Positive for back pain. Negative for arthralgias and myalgias.   Skin:  Negative for color change and rash.   Neurological:  Positive for numbness. Negative for dizziness, tremors, seizures, syncope, weakness and headaches.   Hematological:  Negative for adenopathy. Does not bruise/bleed easily.   Psychiatric/Behavioral:  Negative for dysphoric mood, sleep disturbance and suicidal ideas.    All other systems reviewed and are negative.      Objective   /80 (BP Location: Left arm, Patient Position: Sitting, Cuff Size: Standard)   Pulse 62   Temp 97.8 °F (36.6 °C) (Tympanic)   Resp 16   Ht 4' 11\" (1.499 m)   Wt 76.2 kg (168 lb)   SpO2 97%   BMI 33.93 kg/m²      Physical Exam  Vitals and nursing note reviewed.   Constitutional:       General: She is not in acute distress.     Appearance: She is well-developed. She is not diaphoretic.   HENT:      Head: Normocephalic and atraumatic.      Right Ear: External ear normal.      Left Ear: External ear normal.      Nose: Nose normal.   Eyes:      General:         Right eye: No discharge.         " Left eye: No discharge.      Conjunctiva/sclera: Conjunctivae normal.      Pupils: Pupils are equal, round, and reactive to light.   Neck:      Thyroid: No thyromegaly.      Trachea: No tracheal deviation.   Cardiovascular:      Rate and Rhythm: Normal rate and regular rhythm.      Heart sounds: Normal heart sounds. No murmur heard.     No friction rub.   Pulmonary:      Effort: Pulmonary effort is normal. No respiratory distress.      Breath sounds: Normal breath sounds. No stridor. No wheezing or rales.   Abdominal:      General: Bowel sounds are normal. There is no distension.      Palpations: Abdomen is soft.      Tenderness: There is no abdominal tenderness. There is no guarding.   Musculoskeletal:         General: Tenderness present. No swelling or deformity. Normal range of motion.      Cervical back: Normal range of motion and neck supple.   Lymphadenopathy:      Cervical: No cervical adenopathy.   Skin:     General: Skin is warm and dry.      Capillary Refill: Capillary refill takes less than 2 seconds.      Coloration: Skin is not pale.      Findings: No erythema or rash.   Neurological:      Mental Status: She is alert and oriented to person, place, and time.      Cranial Nerves: No cranial nerve deficit.      Sensory: Sensory deficit present.      Coordination: Coordination normal.   Psychiatric:         Mood and Affect: Mood normal.         Behavior: Behavior normal.

## 2024-12-18 NOTE — ASSESSMENT & PLAN NOTE
Pt was advised to quit smoking  Yearly Ct Chest and PFTs.       Life style Mod  RTC in 3 mos w Blood work

## 2024-12-20 ENCOUNTER — TELEPHONE (OUTPATIENT)
Age: 64
End: 2024-12-20

## 2024-12-23 NOTE — TELEPHONE ENCOUNTER
PA for linzess 72mcg  DENIED    Reason:(Screenshot if applicable)        Message sent to office clinical pool Yes    Denial letter scanned into Media Yes    Appeal started No (Provider will need to decide if appeal is warranted and send clinical documentation to Prior Authorization Team for initiation.)    **Please follow up with your patient regarding denial and next steps**

## 2024-12-23 NOTE — TELEPHONE ENCOUNTER
PA for LINZESS 72 MCG SUBMITTED to [place] [employer]    via    [x]CMM-KEY: [id number]  []Surescripts-Case ID #   []Availity-Auth ID # NDC #   []Faxed to plan   []Other website   []Phone call Case ID #     []PA sent as URGENT    All office notes, labs and other pertaining documents and studies sent. Clinical questions answered. Awaiting determination from insurance company.     Turnaround time for your insurance to make a decision on your Prior Authorization can take 7-21 business days.

## 2025-01-06 NOTE — ASSESSMENT & PLAN NOTE
New evaluation of LBP with RLE radiculopathy  Reports years of back pain without injury that intermittently radiates to the posterior thigh, lateral calf stopping at the ankle. No LLE complaints of BBI  Underwent PT in [place] 2 years ago that made her pain worse  Tried L4-5 ILESI x 2, with repeat scheduled as it provided brief relief  Exam: non-focal    Plan:  Discussed current symptoms with patient and daughter via Macedonian .  With her ongoing symptoms, patient is questioning whether or not surgery would be beneficial for her as it was offered in the past.  She does not have a recent MRI.  Would consider repeat trial of PT to see if this helps with her symptoms.  Referral placed.  Also reviewed that this may be needed from insurance purposes to be approved for an MRI or surgery.    Okay to continue with injections if they are helping.    MRI lumbar spine ordered for further evaluation.  Follow-up once imaging completed to see spine MD.   Call sooner with any questions or concerns.    Orders:    Ambulatory Referral to Neurosurgery    MRI lumbar spine wo contrast; Future    Ambulatory referral to Physical Therapy; Future

## 2025-01-07 ENCOUNTER — OFFICE VISIT (OUTPATIENT)
Dept: NEUROSURGERY | Facility: CLINIC | Age: 65
End: 2025-01-07
Payer: COMMERCIAL

## 2025-01-07 VITALS
SYSTOLIC BLOOD PRESSURE: 122 MMHG | HEART RATE: 55 BPM | DIASTOLIC BLOOD PRESSURE: 62 MMHG | WEIGHT: 168 LBS | OXYGEN SATURATION: 98 % | RESPIRATION RATE: 13 BRPM | HEIGHT: 59 IN | BODY MASS INDEX: 33.87 KG/M2 | TEMPERATURE: 97.6 F

## 2025-01-07 DIAGNOSIS — M54.16 LUMBAR RADICULOPATHY: Primary | ICD-10-CM

## 2025-01-07 PROCEDURE — 99244 OFF/OP CNSLTJ NEW/EST MOD 40: CPT | Performed by: PHYSICIAN ASSISTANT

## 2025-01-07 RX ORDER — ASPIRIN 81 MG/1
81 TABLET, CHEWABLE ORAL DAILY
COMMUNITY

## 2025-01-07 NOTE — PROGRESS NOTES
Name: Young Quiñonez      : 1960      MRN: [id number]  Encounter Provider: Tiffanie Williamson PA-C  Encounter Date: 2025   Encounter department: [facility]  :  Assessment & Plan  Lumbar radiculopathy  New evaluation of LBP with RLE radiculopathy  Reports years of back pain without injury that intermittently radiates to the posterior thigh, lateral calf stopping at the ankle. No LLE complaints of BBI  Underwent PT in [place] 2 years ago that made her pain worse  Tried L4-5 ILESI x 2, with repeat scheduled as it provided brief relief  Exam: non-focal    Plan:  Discussed current symptoms with patient and daughter via Indonesian .  With her ongoing symptoms, patient is questioning whether or not surgery would be beneficial for her as it was offered in the past.  She does not have a recent MRI.  Would consider repeat trial of PT to see if this helps with her symptoms.  Referral placed.  Also reviewed that this may be needed from insurance purposes to be approved for an MRI or surgery.    Okay to continue with injections if they are helping.    MRI lumbar spine ordered for further evaluation.  Follow-up once imaging completed to see spine MD.   Call sooner with any questions or concerns.    Orders:    Ambulatory Referral to Neurosurgery    MRI lumbar spine wo contrast; Future    Ambulatory referral to Physical Therapy; Future        History of Present Illness   64 year old female seen for evaluation of LBP with RLE pain. This has been going on for years. No injuries or trauma. Her pain is across the low back can be as bad as an 8/10. The more active she is the pain worsens. Walking long distances or sitting/standing too long makes it worse. The pain in the right leg comes and goes. This is worse with standing to cook, bending. The pain radiates from the posterior thigh to lateral calf and stops at the ankle. No numbness or tingling. No weakness. No BBI. No LLE  complaints. She did 10 sessions of PT in [place] 2 years ago, but it made her worse. She has had injections, L4-5 ILESI x 2, with repeat scheduled. These did provide brief relief. Takes gabapentin for pain. Reports that surgery was discussed in [place], but she wasn't sure if this was something she should consider. Upper sorbian  utilized.       Review of Systems   Constitutional:  Positive for fatigue.   Gastrointestinal: Negative.    Genitourinary: Negative.    Musculoskeletal:  Positive for back pain (lbp radiates to right leg). Negative for myalgias.   Neurological:  Positive for weakness (right leg weakness) and numbness (tingling right leg).   Hematological:  Bruises/bleeds easily (asa81).   Psychiatric/Behavioral:  Negative for sleep disturbance.     I have personally reviewed the MA's review of systems and made changes as necessary.    Past Medical History   Past Medical History:   Diagnosis Date    Constipated     Hyperlipidemia     Hypertension     Interstitial lung disease (HCC)      Past Surgical History:   Procedure Laterality Date    CATARACT EXTRACTION Right     ECTOPIC PREGNANCY SURGERY  1994    TUBAL LIGATION       Family History   Problem Relation Age of Onset    Hypertension Mother     Hypertension Father     Cancer Brother         lung    Lung cancer Brother     Breast cancer Neg Hx     Colon cancer Neg Hx     Ovarian cancer Neg Hx       reports that she has been smoking cigarettes. She started smoking about 13 years ago. She has a 13 pack-year smoking history. She has never used smokeless tobacco. She reports current alcohol use. She reports that she does not use drugs.  Current Outpatient Medications on File Prior to Visit   Medication Sig Dispense Refill    aspirin 81 mg chewable tablet Chew 81 mg daily      bisoprolol (ZEBETA) 5 mg tablet Take 1 tablet (5 mg total) by mouth daily 90 tablet 3    ergocalciferol (VITAMIN D2) 50,000 units Take 1 capsule (50,000 Units total) by mouth once a week  "12 capsule 2    fluticasone (FLONASE) 50 mcg/act nasal spray 2 sprays into each nostril daily 16 g 5    gabapentin (NEURONTIN) 100 mg capsule AS DIRECTED 60 capsule 5    linaCLOtide 72 MCG CAPS Take 72 mcg by mouth daily at least 30 minutes prior to the first meal of the day 90 capsule 3    lubiprostone (AMITIZA) 8 mcg capsule Take 1 capsule (8 mcg total) by mouth 2 (two) times a day with meals 60 capsule 2    rosuvastatin (CRESTOR) 5 mg tablet Take 1 tablet (5 mg total) by mouth daily 90 tablet 3    Semaglutide-Weight Management (WEGOVY) 0.25 MG/0.5ML Inject 0.5 mL (0.25 mg total) under the skin once a week (Patient not taking: Reported on 1/7/2025) 2 mL 2     No current facility-administered medications on file prior to visit.   No Known Allergies   Social History     Tobacco Use    Smoking status: Some Days     Current packs/day: 1.00     Average packs/day: 1 pack/day for 13.0 years (13.0 ttl pk-yrs)     Types: Cigarettes     Start date: 2012    Smokeless tobacco: Never   Vaping Use    Vaping status: Never Used   Substance and Sexual Activity    Alcohol use: Yes     Comment: couple times/year    Drug use: Never    Sexual activity: Not Currently     Partners: Male     Birth control/protection: Post-menopausal        Objective   /62 (BP Location: Left arm, Patient Position: Sitting, Cuff Size: Standard)   Pulse 55   Temp 97.6 °F (36.4 °C) (Temporal)   Resp 13   Ht 4' 11\" (1.499 m)   Wt 76.2 kg (168 lb)   SpO2 98%   BMI 33.93 kg/m²     Physical Exam  Vitals reviewed.   Constitutional:       General: She is awake.      Appearance: Normal appearance.   HENT:      Head: Normocephalic and atraumatic.   Eyes:      Conjunctiva/sclera: Conjunctivae normal.   Cardiovascular:      Rate and Rhythm: Normal rate.   Pulmonary:      Effort: Pulmonary effort is normal.   Skin:     General: Skin is warm and dry.   Neurological:      Mental Status: She is alert.      Deep Tendon Reflexes:      Reflex Scores:       " Patellar reflexes are 2+ on the right side and 2+ on the left side.  Psychiatric:         Attention and Perception: Attention and perception normal.         Mood and Affect: Mood and affect normal.         Speech: Speech normal.         Behavior: Behavior normal. Behavior is cooperative.         Thought Content: Thought content normal.         Cognition and Memory: Cognition and memory normal.         Judgment: Judgment normal.       Neurological Exam  Mental Status  Awake and alert. Memory is normal. Speech is normal. Language is fluent with no aphasia. Attention and concentration are normal.    Motor  Normal muscle bulk throughout. Normal muscle tone.                                               Right                     Left  Hip flexion                              5                          5  Knee flexion                           5                          5  Knee extension                      5                          5  Plantarflexion                         5                          5  Dorsiflexion                            5                          5    Sensory  Intact to BLE.     Reflexes                                            Right                      Left  Patellar                                2+                         2+    Gait  Casual gait is normal including stance, stride, and arm swing.

## 2025-01-09 ENCOUNTER — HOSPITAL ENCOUNTER (OUTPATIENT)
Dept: RADIOLOGY | Facility: MEDICAL CENTER | Age: 65
Discharge: HOME | End: 2025-01-09
Payer: COMMERCIAL

## 2025-01-09 VITALS
TEMPERATURE: 97.7 F | HEART RATE: 53 BPM | OXYGEN SATURATION: 96 % | RESPIRATION RATE: 20 BRPM | DIASTOLIC BLOOD PRESSURE: 78 MMHG | SYSTOLIC BLOOD PRESSURE: 125 MMHG

## 2025-01-09 DIAGNOSIS — M54.16 LUMBAR RADICULOPATHY: ICD-10-CM

## 2025-01-09 PROCEDURE — 62323 NJX INTERLAMINAR LMBR/SAC: CPT | Performed by: ANESTHESIOLOGY

## 2025-01-09 RX ORDER — METHYLPREDNISOLONE ACETATE 80 MG/ML
80 INJECTION, SUSPENSION INTRA-ARTICULAR; INTRALESIONAL; INTRAMUSCULAR; PARENTERAL; SOFT TISSUE ONCE
Status: COMPLETED | OUTPATIENT
Start: 2025-01-09 | End: 2025-01-09

## 2025-01-09 RX ORDER — BUPIVACAINE HCL/PF 2.5 MG/ML
1 VIAL (ML) INJECTION ONCE
Status: COMPLETED | OUTPATIENT
Start: 2025-01-09 | End: 2025-01-09

## 2025-01-09 RX ADMIN — BUPIVACAINE HYDROCHLORIDE 1 ML: 2.5 INJECTION, SOLUTION EPIDURAL; INFILTRATION; INTRACAUDAL at 14:25

## 2025-01-09 RX ADMIN — METHYLPREDNISOLONE ACETATE 80 MG: 80 INJECTION, SUSPENSION INTRA-ARTICULAR; INTRALESIONAL; INTRAMUSCULAR; SOFT TISSUE at 14:25

## 2025-01-09 RX ADMIN — IOHEXOL 1 ML: 300 INJECTION, SOLUTION INTRAVENOUS at 14:25

## 2025-01-09 NOTE — DISCHARGE INSTRUCTIONS
Epidural Steroid Injection   WHAT YOU NEED TO KNOW:   An epidural steroid injection (PAT) is a procedure to inject steroid medicine into the epidural space. The epidural space is between your spinal cord and vertebrae. Steroids reduce inflammation and fluid buildup in your spine that may be causing pain. You may be given pain medicine along with the steroids.          ACTIVITY  Do not drive or operate machinery today.  No strenuous activity today - bending, lifting, etc.  You may resume normal activites starting tomorrow - start slowly and as tolerated.  You may shower today, but no tub baths or hot tubs.  You may have numbness for several hours from the local anesthetic. Please use caution and common sense, especially with weight-bearing activities.    CARE OF THE INJECTION SITE  If you have soreness or pain, apply ice to the area today (20 minutes on/20 minutes off).  Starting tomorrow, you may use warm, moist heat or ice if needed.  You may have an increase or change in your discomfort for 36-48 hours after your treatment.  Apply ice and continue with any pain medication you have been prescribed.  Notify the [facility] if you have any of the following: redness, drainage, swelling, headache, stiff neck or fever above 100°F.    SPECIAL INSTRUCTIONS  Our office will contact you in approximately 14 days for a progress report.    MEDICATIONS  Continue to take all routine medications.  Our office may have instructed you to hold some medications.    As no general anesthesia was used in today's procedure, you should not experience any side effects related to anesthesia.     If you are diabetic, the steroids used in today's injection may temporarily increase your blood sugar levels after the first few days after your injection. Please keep a close eye on your sugars and alert the doctor who manages your diabetes if your sugars are significantly high from your baseline or you are symptomatic.     If you have a  problem specifically related to your procedure, please call our office at [phone].  Problems not related to your procedure should be directed to your primary care physician.

## 2025-01-09 NOTE — H&P
History of Present Illness: The patient is a 64 y.o. female who presents with complaints of back pain    Past Medical History:   Diagnosis Date    Constipated     Hyperlipidemia     Hypertension     Interstitial lung disease (HCC)        Past Surgical History:   Procedure Laterality Date    CATARACT EXTRACTION Right     ECTOPIC PREGNANCY SURGERY  1994    TUBAL LIGATION           Current Outpatient Medications:     aspirin 81 mg chewable tablet, Chew 81 mg daily, Disp: , Rfl:     bisoprolol (ZEBETA) 5 mg tablet, Take 1 tablet (5 mg total) by mouth daily, Disp: 90 tablet, Rfl: 3    ergocalciferol (VITAMIN D2) 50,000 units, Take 1 capsule (50,000 Units total) by mouth once a week, Disp: 12 capsule, Rfl: 2    fluticasone (FLONASE) 50 mcg/act nasal spray, 2 sprays into each nostril daily, Disp: 16 g, Rfl: 5    gabapentin (NEURONTIN) 100 mg capsule, AS DIRECTED, Disp: 60 capsule, Rfl: 5    linaCLOtide 72 MCG CAPS, Take 72 mcg by mouth daily at least 30 minutes prior to the first meal of the day, Disp: 90 capsule, Rfl: 3    lubiprostone (AMITIZA) 8 mcg capsule, Take 1 capsule (8 mcg total) by mouth 2 (two) times a day with meals, Disp: 60 capsule, Rfl: 2    rosuvastatin (CRESTOR) 5 mg tablet, Take 1 tablet (5 mg total) by mouth daily, Disp: 90 tablet, Rfl: 3    Semaglutide-Weight Management (WEGOVY) 0.25 MG/0.5ML, Inject 0.5 mL (0.25 mg total) under the skin once a week (Patient not taking: Reported on 1/7/2025), Disp: 2 mL, Rfl: 2    Current Facility-Administered Medications:     bupivacaine (PF) (MARCAINE) 0.25 % injection 1 mL, 1 mL, Epidural, Once, Will Berry Hamlin MD    iohexol (OMNIPAQUE) 300 mg/mL injection 1 mL, 1 mL, Epidural, Once, Will Berry Hamlin MD    methylPREDNISolone acetate (DEPO-MEDROL) injection 80 mg, 80 mg, Epidural, Once, Will Berry Hamlin MD    No Known Allergies    Physical Exam:   Vitals:    01/09/25 1414   BP: 127/76   Pulse: 57   Resp: 20   Temp: 97.7 °F (36.5 °C)   SpO2: 95%       General:  Awake, Alert, Oriented x 3, Mood and affect appropriate  Respiratory: Respirations even and unlabored  Cardiovascular: Peripheral pulses intact; no edema  Musculoskeletal Exam: back pain    ASA Score: 1    Patient/Chart Verification  Patient ID Verified: Verbal  ID Band Applied: No  Consents Confirmed: Procedural, To be obtained in the Pre-Procedure area  H&P( within 30 days) Verified: To be obtained in the Procedural area  Interval H&P(within 24 hr) Complete (required for Outpatients and Surgery Admit only): To be obtained in the Procedural area  Allergies Reviewed: Yes  Anticoag/NSAID held?: NA  Currently on antibiotics?: No    Assessment:   1. Lumbar radiculopathy        Plan: L4-5 LESI

## 2025-01-15 ENCOUNTER — TELEPHONE (OUTPATIENT)
Dept: NEUROSURGERY | Facility: CLINIC | Age: 65
End: 2025-01-15

## 2025-01-15 NOTE — TELEPHONE ENCOUNTER
Received call from patient's daughter requesting we fax Pt script to [phone]. Faxed as requested. She was appreciative of the assistance,

## 2025-01-20 DIAGNOSIS — M54.16 LUMBAR RADICULOPATHY: ICD-10-CM

## 2025-01-21 ENCOUNTER — HOSPITAL ENCOUNTER (OUTPATIENT)
Dept: MRI IMAGING | Facility: HOSPITAL | Age: 65
Discharge: HOME | End: 2025-01-21
Payer: COMMERCIAL

## 2025-01-21 DIAGNOSIS — M54.16 LUMBAR RADICULOPATHY: ICD-10-CM

## 2025-01-21 PROCEDURE — 72148 MRI LUMBAR SPINE W/O DYE: CPT

## 2025-01-21 RX ORDER — GABAPENTIN 100 MG/1
CAPSULE ORAL
Qty: 60 CAPSULE | Refills: 5 | Status: SHIPPED | OUTPATIENT
Start: 2025-01-21

## 2025-01-23 ENCOUNTER — TELEPHONE (OUTPATIENT)
Dept: PAIN MEDICINE | Facility: CLINIC | Age: 65
End: 2025-01-23

## 2025-02-05 ENCOUNTER — OFFICE VISIT (OUTPATIENT)
Dept: GASTROENTEROLOGY | Facility: CLINIC | Age: 65
End: 2025-02-05
Payer: COMMERCIAL

## 2025-02-05 VITALS
DIASTOLIC BLOOD PRESSURE: 60 MMHG | BODY MASS INDEX: 33.57 KG/M2 | SYSTOLIC BLOOD PRESSURE: 104 MMHG | WEIGHT: 166.2 LBS | TEMPERATURE: 99 F

## 2025-02-05 DIAGNOSIS — K59.09 CHRONIC CONSTIPATION: Primary | ICD-10-CM

## 2025-02-05 PROCEDURE — 99213 OFFICE O/P EST LOW 20 MIN: CPT | Performed by: PHYSICIAN ASSISTANT

## 2025-02-05 RX ORDER — LUBIPROSTONE 8 UG/1
8 CAPSULE ORAL 2 TIMES DAILY WITH MEALS
Qty: 60 CAPSULE | Refills: 2 | Status: SHIPPED | OUTPATIENT
Start: 2025-02-05

## 2025-02-05 NOTE — PROGRESS NOTES
Name: Young Quiñonez      : 1960      MRN: [id number]  Encounter Provider: Ericka Sosa PA-C  Encounter Date: 2025   Encounter department: [facility] [place]  :  Assessment & Plan  Chronic constipation  Colonoscopy noted 1 large polyp at 20 mm, diverticulosis, and hemorrhoids; the polyp was hyperplastic on pathology.  Amitiza was sent in at her last office visit, but the insurance did not cover this so she has been getting samples from her PCP. She says since she has only been getting samples, she only takes this once every few days but she would like to take this daily as this is working when she does take it. Of note: she tried and failed MiraLAX, stool softeners, fiber supplement; linzess and trulance were also sent in the past and not covered.   -will send in amitiza 8 mcg again: if this is not covered by insurance we can potentially try a prior auth if possible (as this is the only medication that the pt has found relief with)            History of Present Illness   HPI  Young Quiñonez is a 64 y.o. female who presents for follow-up.She says since she has only been getting samples, she only takes this once every few days but she would like to take this daily as this is working when she does take it. She says on the days that she does not take the medication, she stays constipated and her bloating returns. She denies n/v, trouble swallowing, heartburn, diarrhea, unintentional weight loss, fevers, chills, night sweats, bloody or black BM.   History obtained from: patient    Review of Systems   Constitutional:  Negative for chills and fever.   HENT:  Negative for ear pain and sore throat.    Eyes:  Negative for pain and visual disturbance.   Respiratory:  Negative for cough and shortness of breath.    Cardiovascular:  Negative for chest pain and palpitations.   Gastrointestinal:  Positive for abdominal distention and constipation. Negative for abdominal pain, anal  bleeding, blood in stool, diarrhea, nausea, rectal pain and vomiting.   Genitourinary:  Negative for dysuria and hematuria.   Musculoskeletal:  Negative for arthralgias and back pain.   Skin:  Negative for color change and rash.   Neurological:  Negative for seizures and syncope.   All other systems reviewed and are negative.    Medical History Reviewed by provider this encounter:     .  Past Medical History   Past Medical History:   Diagnosis Date    Constipated     Hyperlipidemia     Hypertension     Interstitial lung disease (HCC)      Past Surgical History:   Procedure Laterality Date    CATARACT EXTRACTION Right     COLONOSCOPY      ECTOPIC PREGNANCY SURGERY  1994    TUBAL LIGATION       Family History   Problem Relation Age of Onset    Hypertension Mother     Hypertension Father     Cancer Brother         lung    Lung cancer Brother     Breast cancer Neg Hx     Colon cancer Neg Hx     Ovarian cancer Neg Hx       reports that she has been smoking cigarettes. She started smoking about 13 years ago. She has a 13.1 pack-year smoking history. She has never used smokeless tobacco. She reports current alcohol use. She reports that she does not use drugs.  Current Outpatient Medications on File Prior to Visit   Medication Sig Dispense Refill    aspirin 81 mg chewable tablet Chew 81 mg daily      bisoprolol (ZEBETA) 5 mg tablet Take 1 tablet (5 mg total) by mouth daily 90 tablet 3    ergocalciferol (VITAMIN D2) 50,000 units Take 1 capsule (50,000 Units total) by mouth once a week 12 capsule 2    fluticasone (FLONASE) 50 mcg/act nasal spray 2 sprays into each nostril daily 16 g 5    gabapentin (NEURONTIN) 100 mg capsule AS DIRECTED 60 capsule 5    rosuvastatin (CRESTOR) 5 mg tablet Take 1 tablet (5 mg total) by mouth daily 90 tablet 3    [DISCONTINUED] linaCLOtide 72 MCG CAPS Take 72 mcg by mouth daily at least 30 minutes prior to the first meal of the day 90 capsule 3    Semaglutide-Weight Management (WEGOVY) 0.25  MG/0.5ML Inject 0.5 mL (0.25 mg total) under the skin once a week (Patient not taking: Reported on 2/5/2025) 2 mL 2    [DISCONTINUED] lubiprostone (AMITIZA) 8 mcg capsule Take 1 capsule (8 mcg total) by mouth 2 (two) times a day with meals (Patient not taking: Reported on 2/5/2025) 60 capsule 2     No current facility-administered medications on file prior to visit.   No Known Allergies   Current Outpatient Medications on File Prior to Visit   Medication Sig Dispense Refill    aspirin 81 mg chewable tablet Chew 81 mg daily      bisoprolol (ZEBETA) 5 mg tablet Take 1 tablet (5 mg total) by mouth daily 90 tablet 3    ergocalciferol (VITAMIN D2) 50,000 units Take 1 capsule (50,000 Units total) by mouth once a week 12 capsule 2    fluticasone (FLONASE) 50 mcg/act nasal spray 2 sprays into each nostril daily 16 g 5    gabapentin (NEURONTIN) 100 mg capsule AS DIRECTED 60 capsule 5    rosuvastatin (CRESTOR) 5 mg tablet Take 1 tablet (5 mg total) by mouth daily 90 tablet 3    [DISCONTINUED] linaCLOtide 72 MCG CAPS Take 72 mcg by mouth daily at least 30 minutes prior to the first meal of the day 90 capsule 3    Semaglutide-Weight Management (WEGOVY) 0.25 MG/0.5ML Inject 0.5 mL (0.25 mg total) under the skin once a week (Patient not taking: Reported on 2/5/2025) 2 mL 2    [DISCONTINUED] lubiprostone (AMITIZA) 8 mcg capsule Take 1 capsule (8 mcg total) by mouth 2 (two) times a day with meals (Patient not taking: Reported on 2/5/2025) 60 capsule 2     No current facility-administered medications on file prior to visit.      Social History     Tobacco Use    Smoking status: Some Days     Current packs/day: 1.00     Average packs/day: 1 pack/day for 13.1 years (13.1 ttl pk-yrs)     Types: Cigarettes     Start date: 2012    Smokeless tobacco: Never   Vaping Use    Vaping status: Never Used   Substance and Sexual Activity    Alcohol use: Yes     Comment: couple times/year    Drug use: Never    Sexual activity: Not Currently      Partners: Male     Birth control/protection: Post-menopausal        Objective   There were no vitals taken for this visit.     Physical Exam  Constitutional:       Appearance: Normal appearance.   Cardiovascular:      Rate and Rhythm: Normal rate and regular rhythm.   Pulmonary:      Breath sounds: Normal breath sounds.   Abdominal:      General: Bowel sounds are normal. There is no distension.      Palpations: There is no mass.      Tenderness: There is no abdominal tenderness. There is no guarding or rebound.   Neurological:      Mental Status: She is alert.         Administrative Statements   I have spent a total time of 30 minutes in caring for this patient on the day of the visit/encounter including Diagnostic results, Prognosis, Risks and benefits of tx options, Instructions for management, Patient and family education, Importance of tx compliance, Risk factor reductions, Impressions, Counseling / Coordination of care, Documenting in the medical record, Reviewing / ordering tests, medicine, procedures  , Obtaining or reviewing history  , and Communicating with other healthcare professionals .

## 2025-02-06 ENCOUNTER — OFFICE VISIT (OUTPATIENT)
Dept: DERMATOLOGY | Facility: CLINIC | Age: 65
End: 2025-02-06
Payer: COMMERCIAL

## 2025-02-06 VITALS — TEMPERATURE: 96.8 F

## 2025-02-06 DIAGNOSIS — L30.9 HAND DERMATITIS: Primary | ICD-10-CM

## 2025-02-06 DIAGNOSIS — B35.1 ONYCHOMYCOSIS: ICD-10-CM

## 2025-02-06 PROCEDURE — 99204 OFFICE O/P NEW MOD 45 MIN: CPT | Performed by: DERMATOLOGY

## 2025-02-06 PROCEDURE — 88312 SPECIAL STAINS GROUP 1: CPT | Performed by: STUDENT IN AN ORGANIZED HEALTH CARE EDUCATION/TRAINING PROGRAM

## 2025-02-06 PROCEDURE — 88305 TISSUE EXAM BY PATHOLOGIST: CPT | Performed by: STUDENT IN AN ORGANIZED HEALTH CARE EDUCATION/TRAINING PROGRAM

## 2025-02-06 RX ORDER — CICLOPIROX OLAMINE 7.7 MG/G
CREAM TOPICAL 2 TIMES DAILY
Qty: 90 G | Refills: 6 | Status: SHIPPED | OUTPATIENT
Start: 2025-02-06

## 2025-02-06 RX ORDER — TRIAMCINOLONE ACETONIDE 1 MG/G
CREAM TOPICAL 2 TIMES DAILY
Qty: 80 G | Refills: 3 | Status: SHIPPED | OUTPATIENT
Start: 2025-02-06

## 2025-02-06 NOTE — PROGRESS NOTES
"[facility] Dermatology Clinic Note     Patient Name: Young Quiñonez  Encounter Date: 2/6/25     Have you been cared for by a [facility] Dermatologist in the last 3 years and, if so, which description applies to you?    NO.   I am considered a \"new\" patient and must complete all patient intake questions. I am FEMALE/of child-bearing potential.    REVIEW OF SYSTEMS:  Have you recently had or currently have any of the following? Recent fever or chills? No  Any non-healing wound? No  Are you pregnant or planning to become pregnant? No  Are you currently or planning to be nursing or breast feeding? No   PAST MEDICAL HISTORY:  Have you personally ever had or currently have any of the following?  If \"YES,\" then please provide more detail. Skin cancer (such as Melanoma, Basal Cell Carcinoma, Squamous Cell Carcinoma?  No  Tuberculosis, HIV/AIDS, Hepatitis B or C: No  Radiation Treatment No   HISTORY OF IMMUNOSUPPRESSION:   Do you have a history of any of the following:  Systemic Immunosuppression such as Diabetes, Biologic or Immunotherapy, Chemotherapy, Organ Transplantation, Bone Marrow Transplantation or Prednsione?  No    Answering \"YES\" requires the addition of the dotphrase \"IMMUNOSUPPRESSED\" as the first diagnosis of the patient's visit.   FAMILY HISTORY:  Any \"first degree relatives\" (parent, brother, sister, or child) with the following?    Skin Cancer, Pancreatic or Other Cancer? No   PATIENT EXPERIENCE:    Do you want the Dermatologist to perform a COMPLETE skin exam today including a clinical examination under the \"bra and underwear\" areas?  NO  If necessary, do we have your permission to call and leave a detailed message on your Preferred Phone number that includes your specific medical information?  Yes      No Known Allergies   Current Outpatient Medications:   •  aspirin 81 mg chewable tablet, Chew 81 mg daily, Disp: , Rfl:   •  bisoprolol (ZEBETA) 5 mg tablet, Take 1 tablet (5 mg total) by mouth daily, Disp: " 90 tablet, Rfl: 3  •  ciclopirox (LOPROX) 0.77 % cream, Apply topically 2 (two) times a day Apply to affected areas on feet., Disp: 90 g, Rfl: 6  •  ergocalciferol (VITAMIN D2) 50,000 units, Take 1 capsule (50,000 Units total) by mouth once a week, Disp: 12 capsule, Rfl: 2  •  fluticasone (FLONASE) 50 mcg/act nasal spray, 2 sprays into each nostril daily, Disp: 16 g, Rfl: 5  •  gabapentin (NEURONTIN) 100 mg capsule, AS DIRECTED, Disp: 60 capsule, Rfl: 5  •  lubiprostone (AMITIZA) 8 mcg capsule, Take 1 capsule (8 mcg total) by mouth 2 (two) times a day with meals, Disp: 60 capsule, Rfl: 2  •  rosuvastatin (CRESTOR) 5 mg tablet, Take 1 tablet (5 mg total) by mouth daily, Disp: 90 tablet, Rfl: 3  •  triamcinolone (KENALOG) 0.1 % cream, Apply topically 2 (two) times a day For up to two weeks with a one week break in between tretaments, Disp: 80 g, Rfl: 3  •  Semaglutide-Weight Management (WEGOVY) 0.25 MG/0.5ML, Inject 0.5 mL (0.25 mg total) under the skin once a week (Patient not taking: Reported on 1/7/2025), Disp: 2 mL, Rfl: 2          Whom besides the patient is providing clinical information about today's encounter?   NO ADDITIONAL HISTORIAN (patient alone provided history)    Physical Exam and Assessment/Plan by Diagnosis:    HANDS DERMATITIS     Physical Exam:  Anatomic Location Affected:  bilateral hands  Morphological Description:  diffuse xerosis with scaling dorsum of both hands and dry finter tips  Pertinent Positives:  Pertinent Negatives:    Additional History of Present Condition:  Pt has cracks on her hands that she thinks fabian there because of how much she washes her hands. She washes her hands around 10 times a day. She does use cream at home     Assessment and Plan:  Based on a thorough discussion of this condition and the management approach to it (including a comprehensive discussion of the known risks, side effects and potential benefits of treatment), the patient (family) agrees to implement the  "following specific plan:  Try to not wash your hands as often unless needed  Wear gloves if doing the dishes  Wear a thick cream like CeraVe, Cetaphil or Eucerin after washing your hands multiple times a day   Start Triamcinolone 0.1% cream when your hands get bad twice a day for two weeks with a one week       RULE OUT ONYCHOMYCOSIS (\"FUNGAL NAIL\")    Physical Exam:  Anatomic Location Affected:   distal third oncycholysi of great toe right  Morphological Description:  distal onycholysis great toe  Pertinent Positives:  Pertinent Negatives:    Additional History of Present Condition:  pt has some nail fungus. We reviewed her recent CMP from 12/18/24 and they were within normal limits     Assessment and Plan:  Based on a thorough discussion of this condition and the management approach to it (including a comprehensive discussion of the known risks, side effects and potential benefits of treatment), the patient (family) agrees to implement the following specific plan:  Nail clipping done in office today  Start Ciclopirox 0.77% cream daily   We did discuss risk benefit of oral terbinafin.  Await culturel    What are fungal nail infections?  Fungal infection of the nails is also known as onychomycosis. It is increasingly common with increased age. It rarely affects children.    Which organisms cause onychomycosis?  Onychomycosis can be due to:  Dermatophytes such as Trichophyton rubrum (T. rubrum), T. interdigitale (tinea unguium)   Yeasts such as Candida albicans   Molds such as Scopulariopsis brevicaulis and Fusarium species.    What are the clinical features of onychomycosis?  Onychomycosis may affect one or more toenails and fingernails and most often involves the great toenail or the little toenail. It can present in one or several different patterns.  Lateral onychomycosis: a white or yellow opaque streak appears at one side of the nail.   Subungual hyperkeratosis: scaling occurs under the nail.   Distal " onycholysis: the end of the nail lifts. The free edge often crumbles.   Superficial white onychomycosis: flaky white patches and pits appear on the top of the nail plate.   Proximal onychomycosis:yellow spots appear in the half-moon (lunula).   Onychoma or dermatophytoma:a thick localized area of infection in the nail plate   Destruction of the nail    Tinea unguium often results from untreated tinea pedis (feet) or tinea manuum (hand). It may follow an injury to the nail or inflammatory disease of the nail.  Candida infection of the nail plate generally results from paronychia and starts near the nail fold (the cuticle). The nail fold is swollen and red, lifted off the nail plate. White, yellow, green or black marks appear on the nearby nail and spread. The nail may lift off its bed and is tender if you press on it.  Mold infections are similar in appearance to tinea unguium.  Onychomycosis must be distinguished from other nail disorders.  Bacterial infection especially Pseudomonas aeruginosa, which turns the nail black or green   Psoriasis   Eczema or dermatitis   Lichen planus   Viral warts   Onycholysis   Onychogryphosis (nail thickening and scaling under the nail), common in the elderly    How is the diagnosis of onychomycosis confirmed?  Clippings should be taken from crumbling tissue at the end of the infected nail. The discolored surface of the nails can be scraped off. The debris can be scooped out from under the nail. The clippings and scrapings are sent to a mycology laboratory for microscopy and culture.  Previous treatment can reduce the chance of growing the fungus successfully in a culture, so it is best to take the clippings before any treatment is commenced:  To confirm the diagnosis -- antifungal treatment will not be successful if there is another explanation for the nail condition   To identify the responsible organism. Molds and yeasts may require different treatment from dermatophyte fungi    Treatment may be required for a prolonged period and is expensive. Partially treated infection may be impossible to prove for many months as antifungal drugs can be detected even a year later.  A nail biopsy may also reveal characteristic histopathological features of onychomycosis.    What is the treatment of onychomycosis?  Fingernail infections are usually cured more quickly and effectively than toenail infections.  Mild infections affecting less than 50% of one or two nails may respond to topical antifungal medications, but cure usually requires an oral antifungal medication for several months.    Devices used to treat onychomycosis  Recently, non-drug treatment has been developed to treat onychomycosis thus avoiding the side effects and risks of oral antifungal drugs.  Lasers emitting infrared radiation are thought to kill fungi by the production of heat within the infected tissue. Laser treatment is reported to safely eradicate nail fungi with one to three, almost painless, sessions. Several lasers have been approved for this purpose by the FDA and other regulatory authorities. However, high-quality studies of efficacy are lacking, and existing studies indicate that laser treatment is less medically effective than topical or oral antifungal agents.  Nd:YAG continuous, long or short-pulsed lasers   Ti:Sapphire modelocked laser   Diode laser  THIS patient was seen together with Dr Kiah Barron Attestation    I,:  David Vallecillo MA am acting as a scribe while in the presence of the attending physician.:       I,:  Dwaine Vo MD personally performed the services described in this documentation    as scribed in my presence.:

## 2025-02-06 NOTE — PATIENT INSTRUCTIONS
"HANDS DERMATITIS     Assessment and Plan:  Based on a thorough discussion of this condition and the management approach to it (including a comprehensive discussion of the known risks, side effects and potential benefits of treatment), the patient (family) agrees to implement the following specific plan:  Try to not wash your hands as often unless needed  Wear gloves if doing the dishes  Wear a thick cream like CeraVe, Cetaphil or Eucerin after washing your hands multiple times a day   Start Triamcinolone 0.1% cream when your hands get bad twice a day for two weeks with a one week       POSSIBLE ONYCHOMYCOSIS (\"FUNGAL NAIL\")    Assessment and Plan:  Based on a thorough discussion of this condition and the management approach to it (including a comprehensive discussion of the known risks, side effects and potential benefits of treatment), the patient (family) agrees to implement the following specific plan:  Nail clipping done in office today  Start Ciclopirox 0.77% cream daily    What are fungal nail infections?  Fungal infection of the nails is also known as onychomycosis. It is increasingly common with increased age. It rarely affects children.    Which organisms cause onychomycosis?  Onychomycosis can be due to:  Dermatophytes such as Trichophyton rubrum (T. rubrum), T. interdigitale (tinea unguium)   Yeasts such as Candida albicans   Molds such as Scopulariopsis brevicaulis and Fusarium species.    What are the clinical features of onychomycosis?  Onychomycosis may affect one or more toenails and fingernails and most often involves the great toenail or the little toenail. It can present in one or several different patterns.  Lateral onychomycosis: a white or yellow opaque streak appears at one side of the nail.   Subungual hyperkeratosis: scaling occurs under the nail.   Distal onycholysis: the end of the nail lifts. The free edge often crumbles.   Superficial white onychomycosis: flaky white patches and pits " appear on the top of the nail plate.   Proximal onychomycosis:yellow spots appear in the half-moon (lunula).   Onychoma or dermatophytoma:a thick localized area of infection in the nail plate   Destruction of the nail    Tinea unguium often results from untreated tinea pedis (feet) or tinea manuum (hand). It may follow an injury to the nail or inflammatory disease of the nail.  Candida infection of the nail plate generally results from paronychia and starts near the nail fold (the cuticle). The nail fold is swollen and red, lifted off the nail plate. White, yellow, green or black marks appear on the nearby nail and spread. The nail may lift off its bed and is tender if you press on it.  Mold infections are similar in appearance to tinea unguium.  Onychomycosis must be distinguished from other nail disorders.  Bacterial infection especially Pseudomonas aeruginosa, which turns the nail black or green   Psoriasis   Eczema or dermatitis   Lichen planus   Viral warts   Onycholysis   Onychogryphosis (nail thickening and scaling under the nail), common in the elderly    How is the diagnosis of onychomycosis confirmed?  Clippings should be taken from crumbling tissue at the end of the infected nail. The discolored surface of the nails can be scraped off. The debris can be scooped out from under the nail. The clippings and scrapings are sent to a mycology laboratory for microscopy and culture.  Previous treatment can reduce the chance of growing the fungus successfully in a culture, so it is best to take the clippings before any treatment is commenced:  To confirm the diagnosis -- antifungal treatment will not be successful if there is another explanation for the nail condition   To identify the responsible organism. Molds and yeasts may require different treatment from dermatophyte fungi   Treatment may be required for a prolonged period and is expensive. Partially treated infection may be impossible to prove for many  months as antifungal drugs can be detected even a year later.  A nail biopsy may also reveal characteristic histopathological features of onychomycosis.    What is the treatment of onychomycosis?  Fingernail infections are usually cured more quickly and effectively than toenail infections.  Mild infections affecting less than 50% of one or two nails may respond to topical antifungal medications, but cure usually requires an oral antifungal medication for several months.    Devices used to treat onychomycosis  Recently, non-drug treatment has been developed to treat onychomycosis thus avoiding the side effects and risks of oral antifungal drugs.  Lasers emitting infrared radiation are thought to kill fungi by the production of heat within the infected tissue. Laser treatment is reported to safely eradicate nail fungi with one to three, almost painless, sessions. Several lasers have been approved for this purpose by the FDA and other regulatory authorities. However, high-quality studies of efficacy are lacking, and existing studies indicate that laser treatment is less medically effective than topical or oral antifungal agents.  Nd:YAG continuous, long or short-pulsed lasers   Ti:Sapphire modelocked laser   Diode laser

## 2025-02-10 ENCOUNTER — RESULTS FOLLOW-UP (OUTPATIENT)
Age: 65
End: 2025-02-10

## 2025-02-10 PROCEDURE — 88312 SPECIAL STAINS GROUP 1: CPT | Performed by: STUDENT IN AN ORGANIZED HEALTH CARE EDUCATION/TRAINING PROGRAM

## 2025-02-10 PROCEDURE — 88305 TISSUE EXAM BY PATHOLOGIST: CPT | Performed by: STUDENT IN AN ORGANIZED HEALTH CARE EDUCATION/TRAINING PROGRAM

## 2025-02-10 NOTE — RESULT ENCOUNTER NOTE
RESULT NOTE    Results reviewed by ordering physician.  Called patient to personally discuss results. Discussed results with patient.       Instructions for Clinical Derm Team:   (remember to route Result Note to appropriate staff):    None    Result & Plan by Specimen:    Specimen A: Fungi are not seen with PAS stain.  Plan: Given inadequate sample but clinical suspicion for tinea, can continue topical ciclopirox.         Component  Ref Range & Units (hover)   Case Report  Surgical Pathology Report                         Case: [id number]                                  Authorizing Provider:  Adela Dupont MD            Collected:           02/06/2025 1710              Ordering Location:     [facility]      Received:            02/06/2025 1710                                     [place]                                                                Pathologist:           Rusty Peña MD                                                          Specimen:    Nail, a. great toe                                                                      Final Diagnosis  A. Nail, great toe:    Fungi are not seen with PAS stain.    Electronically signed by Rusty Peña MD on 2/10/2025 at 1057 EST

## 2025-02-24 ENCOUNTER — TELEPHONE (OUTPATIENT)
Age: 65
End: 2025-02-24

## 2025-02-24 ENCOUNTER — OFFICE VISIT (OUTPATIENT)
Dept: NEUROSURGERY | Facility: CLINIC | Age: 65
End: 2025-02-24
Payer: COMMERCIAL

## 2025-02-24 VITALS
DIASTOLIC BLOOD PRESSURE: 58 MMHG | BODY MASS INDEX: 33.47 KG/M2 | SYSTOLIC BLOOD PRESSURE: 100 MMHG | HEIGHT: 59 IN | TEMPERATURE: 97.6 F | WEIGHT: 166 LBS | RESPIRATION RATE: 18 BRPM | OXYGEN SATURATION: 96 % | HEART RATE: 73 BPM

## 2025-02-24 DIAGNOSIS — Z48.89 AFTERCARE FOLLOWING SURGERY FOR INJURY AND TRAUMA: Primary | ICD-10-CM

## 2025-02-24 PROCEDURE — 99214 OFFICE O/P EST MOD 30 MIN: CPT | Performed by: SPECIALIST

## 2025-02-24 NOTE — PROGRESS NOTES
Name: Young Quiñonez      : 1960      MRN: [id number]  Encounter Provider: Rajendra Beltran MD  Encounter Date: 2025   Encounter department: [facility] [place]  :  Assessment & Plan      Patient here to discuss surgery after seemingly not getting a benefit from epidural injections or physical therapy.  I was able to review the MRI study with her and her daughter in detail with the aid of a Croatian speaking .  I explained to them what surgery could entail.  I informed her that she has multilevel stenosis in the lumbar spine.  She would most likely require a multilevel decompressive laminectomy as well as supplemental instrumented fusion.  I explained that it is a fairly involved procedure, but it can be done considering she has failed other treatment options.  At this point the patient is not ready to consent to surgery.  Instead, she wants to go home and have further discussions with her family.  Understandably, she is very hesitant to proceed with any extensive surgical operation.  She prefers to likely manage this nonsurgically.  I welcomed her to return to the office in the future should she have any further questions or concerns.  I was able to answer all of their questions today.  They were very thankful for our extensive discussions.    Jason Beltran MD    History of Present Illness     Young Quiñonez is a 64 y.o. female who presents with ongoing low back pain that she has had for many years.  She has attempted physical therapy, but stopped short because it made her worse.  She states she also had 3 epidural injections in the past with minimal success.  She is here today to discuss possible surgical intervention.  She denies any bowel or bladder incontinence.    HPI     Review of Systems   HENT:  Negative for trouble swallowing.    Genitourinary:  Negative for enuresis.   Musculoskeletal:  Positive for back pain (right sided lbp into right leg to ankle).  Negative for gait problem and myalgias.        Pain worsens with movement    Neurological:  Positive for numbness (RLE not in foot). Negative for weakness.   Hematological:  Bruises/bleeds easily (asa81).   All other systems reviewed and are negative.    I have personally reviewed the MA's review of systems and made changes as necessary.    Past Medical History   Past Medical History:   Diagnosis Date    Constipated     Hyperlipidemia     Hypertension     Interstitial lung disease (HCC)      Past Surgical History:   Procedure Laterality Date    CATARACT EXTRACTION Right     COLONOSCOPY      ECTOPIC PREGNANCY SURGERY  1994    TUBAL LIGATION       Family History   Problem Relation Age of Onset    Hypertension Mother     Hypertension Father     Cancer Brother         lung    Lung cancer Brother     Breast cancer Neg Hx     Colon cancer Neg Hx     Ovarian cancer Neg Hx      she reports that she has quit smoking. Her smoking use included cigarettes. She started smoking about 13 years ago. She has a 13.1 pack-year smoking history. She has never used smokeless tobacco. She reports current alcohol use. She reports that she does not use drugs.  Current Outpatient Medications   Medication Instructions    aspirin 81 mg, Daily    bisoprolol (ZEBETA) 5 mg, Oral, Daily    ciclopirox (LOPROX) 0.77 % cream Topical, 2 times daily, Apply to affected areas on feet.    ergocalciferol (VITAMIN D2) 50,000 Units, Oral, Weekly    fluticasone (FLONASE) 50 mcg/act nasal spray 2 sprays, Nasal, Daily    gabapentin (NEURONTIN) 100 mg capsule AS DIRECTED    lubiprostone (AMITIZA) 8 mcg, Oral, 2 times daily with meals    rosuvastatin (CRESTOR) 5 mg, Oral, Daily    Semaglutide-Weight Management (WEGOVY) 0.25 mg, Subcutaneous, Weekly    triamcinolone (KENALOG) 0.1 % cream Topical, 2 times daily, For up to two weeks with a one week break in between tretaments   No Known Allergies     MRI lumbar spine 1/21/25    Spondylosis associated with spinal  "canal and neural foraminal narrowing detailed level by level above, most notable at L3-L4, L4-L5 and L5-S1 and progressed when compared to the 3/23/2022 MRI lumbar spine.   Objective   Resp 18   Ht 4' 11\" (1.499 m)   Wt 75.3 kg (166 lb)   BMI 33.53 kg/m²     Physical Exam  Neurological Exam    Neurological Exam  Mental Status  Awake and alert. Memory is normal. Speech is normal. Language is fluent with no aphasia. Attention and concentration are normal.     Motor  Normal muscle bulk throughout. Normal muscle tone.                                                Right                     Left  Hip flexion                              5                          5  Knee flexion                           5                          5  Knee extension                      5                          5  Plantarflexion                         5                          5  Dorsiflexion                            5                          5     Sensory  Intact to BLE.      Reflexes                                            Right                      Left  Patellar                                2+                         2+     Gait  Casual gait is normal including stance, stride, and arm swing.                 "

## 2025-02-24 NOTE — TELEPHONE ENCOUNTER
Patients daughter states patient is doing well on the Linzess samples. Asking for a new sample tomorrow.

## 2025-03-07 DIAGNOSIS — L30.9 HAND DERMATITIS: ICD-10-CM

## 2025-03-11 RX ORDER — TRIAMCINOLONE ACETONIDE 1 MG/G
CREAM TOPICAL 2 TIMES DAILY
Qty: 454 G | Refills: 0 | Status: SHIPPED | OUTPATIENT
Start: 2025-03-11 | End: 2025-03-24

## 2025-03-11 NOTE — TELEPHONE ENCOUNTER
Pt was seen 02/06/25      HANDS DERMATITIS     Physical Exam:  · Anatomic Location Affected:  bilateral hands  · Morphological Description:  diffuse xerosis with scaling dorsum of both hands and dry finter tips  · Pertinent Positives:  · Pertinent Negatives:    Additional History of Present Condition:  Pt has cracks on her hands that she thinks fabian there because of how much she washes her hands. She washes her hands around 10 times a day. She does use cream at home     Assessment and Plan:  Based on a thorough discussion of this condition and the management approach to it (including a comprehensive discussion of the known risks, side effects and potential benefits of treatment), the patient (family) agrees to implement the following specific plan:  · Try to not wash your hands as often unless needed  · Wear gloves if doing the dishes  · Wear a thick cream like CeraVe, Cetaphil or Eucerin after washing your hands multiple times a day   · Start Triamcinolone 0.1% cream when your hands get bad twice a day for two weeks with a one week

## 2025-03-21 ENCOUNTER — TELEPHONE (OUTPATIENT)
Age: 65
End: 2025-03-21

## 2025-03-21 ENCOUNTER — APPOINTMENT (OUTPATIENT)
Dept: LAB | Age: 65
End: 2025-03-21
Payer: COMMERCIAL

## 2025-03-21 DIAGNOSIS — K58.1 IRRITABLE BOWEL SYNDROME WITH CONSTIPATION: ICD-10-CM

## 2025-03-21 DIAGNOSIS — I10 PRIMARY HYPERTENSION: ICD-10-CM

## 2025-03-21 DIAGNOSIS — M54.16 LUMBAR RADICULOPATHY: Primary | ICD-10-CM

## 2025-03-21 DIAGNOSIS — E78.49 OTHER HYPERLIPIDEMIA: ICD-10-CM

## 2025-03-21 LAB
ALBUMIN SERPL BCG-MCNC: 4.2 G/DL (ref 3.5–5)
ALP SERPL-CCNC: 58 U/L (ref 34–104)
ALT SERPL W P-5'-P-CCNC: 14 U/L (ref 7–52)
ANION GAP SERPL CALCULATED.3IONS-SCNC: 7 MMOL/L (ref 4–13)
AST SERPL W P-5'-P-CCNC: 21 U/L (ref 13–39)
BILIRUB SERPL-MCNC: 0.38 MG/DL (ref 0.2–1)
BUN SERPL-MCNC: 18 MG/DL (ref 5–25)
CALCIUM SERPL-MCNC: 9.5 MG/DL (ref 8.4–10.2)
CHLORIDE SERPL-SCNC: 105 MMOL/L (ref 96–108)
CO2 SERPL-SCNC: 26 MMOL/L (ref 21–32)
CREAT SERPL-MCNC: 0.47 MG/DL (ref 0.6–1.3)
GFR SERPL CREATININE-BSD FRML MDRD: 106 ML/MIN/1.73SQ M
GLUCOSE P FAST SERPL-MCNC: 83 MG/DL (ref 65–99)
MAGNESIUM SERPL-MCNC: 1.9 MG/DL (ref 1.9–2.7)
POTASSIUM SERPL-SCNC: 3.9 MMOL/L (ref 3.5–5.3)
PROT SERPL-MCNC: 7.1 G/DL (ref 6.4–8.4)
SODIUM SERPL-SCNC: 138 MMOL/L (ref 135–147)
T4 FREE SERPL-MCNC: 0.8 NG/DL (ref 0.61–1.12)
TSH SERPL DL<=0.05 MIU/L-ACNC: 1.97 UIU/ML (ref 0.45–4.5)

## 2025-03-21 PROCEDURE — 83735 ASSAY OF MAGNESIUM: CPT

## 2025-03-21 PROCEDURE — 36415 COLL VENOUS BLD VENIPUNCTURE: CPT

## 2025-03-21 PROCEDURE — 80053 COMPREHEN METABOLIC PANEL: CPT

## 2025-03-21 PROCEDURE — 84443 ASSAY THYROID STIM HORMONE: CPT

## 2025-03-21 PROCEDURE — 84439 ASSAY OF FREE THYROXINE: CPT

## 2025-03-21 NOTE — TELEPHONE ENCOUNTER
Caller: pt    Doctor: Dr. bartholomew    Reason for call: pt would like to get another injection for her low back pain.    Call back#: [phone]

## 2025-03-22 DIAGNOSIS — L30.9 HAND DERMATITIS: ICD-10-CM

## 2025-03-24 RX ORDER — TRIAMCINOLONE ACETONIDE 1 MG/G
CREAM TOPICAL 2 TIMES DAILY
Qty: 30 G | Refills: 0 | Status: SHIPPED | OUTPATIENT
Start: 2025-03-24

## 2025-03-24 NOTE — TELEPHONE ENCOUNTER
Caller: Chapo     Doctor: Dr. Hamlin    Reason for call: patients daughter returning call.     Call back#: [phone]

## 2025-03-24 NOTE — TELEPHONE ENCOUNTER
LMOM for pt to C/B, C/B #  and OH provided.     Pt's Daughter Chapo is on [facility].    Calling to get more information about pt's increased pain.  Location, left right, or both, radiating  When pain returned  0/10 pain level

## 2025-03-26 ENCOUNTER — OFFICE VISIT (OUTPATIENT)
Dept: FAMILY MEDICINE CLINIC | Facility: CLINIC | Age: 65
End: 2025-03-26
Payer: COMMERCIAL

## 2025-03-26 VITALS
TEMPERATURE: 97.5 F | WEIGHT: 165 LBS | DIASTOLIC BLOOD PRESSURE: 82 MMHG | SYSTOLIC BLOOD PRESSURE: 124 MMHG | OXYGEN SATURATION: 97 % | HEART RATE: 59 BPM | HEIGHT: 59 IN | BODY MASS INDEX: 33.26 KG/M2 | RESPIRATION RATE: 15 BRPM

## 2025-03-26 DIAGNOSIS — I10 PRIMARY HYPERTENSION: ICD-10-CM

## 2025-03-26 DIAGNOSIS — E78.49 OTHER HYPERLIPIDEMIA: ICD-10-CM

## 2025-03-26 DIAGNOSIS — M81.8 IDIOPATHIC OSTEOPOROSIS: ICD-10-CM

## 2025-03-26 DIAGNOSIS — M48.062 SPINAL STENOSIS OF LUMBAR REGION WITH NEUROGENIC CLAUDICATION: ICD-10-CM

## 2025-03-26 DIAGNOSIS — M54.16 LUMBAR RADICULOPATHY: ICD-10-CM

## 2025-03-26 DIAGNOSIS — H53.8 BLURRED VISION, BILATERAL: ICD-10-CM

## 2025-03-26 DIAGNOSIS — K58.1 IRRITABLE BOWEL SYNDROME WITH CONSTIPATION: Primary | ICD-10-CM

## 2025-03-26 DIAGNOSIS — R73.09 ELEVATED HEMOGLOBIN A1C: ICD-10-CM

## 2025-03-26 DIAGNOSIS — R06.02 SOB (SHORTNESS OF BREATH): ICD-10-CM

## 2025-03-26 DIAGNOSIS — E55.9 VITAMIN D DEFICIENCY: ICD-10-CM

## 2025-03-26 DIAGNOSIS — J84.9 INTERSTITIAL LUNG DISEASE (HCC): ICD-10-CM

## 2025-03-26 LAB — SL AMB POCT HEMOGLOBIN AIC: 5.6 (ref ?–6.5)

## 2025-03-26 PROCEDURE — 99214 OFFICE O/P EST MOD 30 MIN: CPT | Performed by: INTERNAL MEDICINE

## 2025-03-26 PROCEDURE — 83036 HEMOGLOBIN GLYCOSYLATED A1C: CPT | Performed by: INTERNAL MEDICINE

## 2025-03-26 RX ORDER — BISOPROLOL FUMARATE 5 MG/1
5 TABLET, FILM COATED ORAL DAILY
Qty: 90 TABLET | Refills: 3 | Status: SHIPPED | OUTPATIENT
Start: 2025-03-26

## 2025-03-26 RX ORDER — ROSUVASTATIN CALCIUM 5 MG/1
5 TABLET, COATED ORAL DAILY
Qty: 90 TABLET | Refills: 3 | Status: SHIPPED | OUTPATIENT
Start: 2025-03-26

## 2025-03-26 RX ORDER — ERGOCALCIFEROL 1.25 MG/1
50000 CAPSULE, LIQUID FILLED ORAL WEEKLY
Qty: 12 CAPSULE | Refills: 2 | Status: SHIPPED | OUTPATIENT
Start: 2025-03-26

## 2025-03-26 NOTE — PATIENT INSTRUCTIONS

## 2025-03-26 NOTE — TELEPHONE ENCOUNTER
S/W Amal as per ANANTH.  Pt requested repeat injection of L4-5 LESI.    Last injection date:  1/9/25  Last office visit:  10/30/24  Where is pain located: b/l lower back and right leg  Is the pain the same area as before: Yes  Current pain level:  7/10  How much % of relief did the last injection provide:  50%  Duration of relief from last injection:  7 weeks, she did PT and it increased her pain  When did pain return: about 1 month ago  Is the pain effecting your ADLs: yes    Blood thinners/NSAIDS? No, only takes ASA 81 mg daily  Diabetes? No                    Okay to schedule?  Please advise.

## 2025-03-26 NOTE — PROGRESS NOTES
Name: Young Quiñonez      : 1960      MRN: [id number]  Encounter Provider: Parveen Sosa MD  Encounter Date: 3/26/2025   Encounter department: [facility]  :  Assessment & Plan  Other hyperlipidemia    Orders:    rosuvastatin (CRESTOR) 5 mg tablet; Take 1 tablet (5 mg total) by mouth daily    UA (URINE) with reflex to Scope; Future    Magnesium; Future    TSH, 3rd generation; Future    T4, free; Future    CBC and differential; Future    Comprehensive metabolic panel; Future    Lipid panel; Future    NM myocardial perfusion spect (rx stress and/or rest); Future    Vitamin D deficiency    Orders:    ergocalciferol (VITAMIN D2) 50,000 units; Take 1 capsule (50,000 Units total) by mouth once a week    Primary hypertension    Orders:    bisoprolol (ZEBETA) 5 mg tablet; Take 1 tablet (5 mg total) by mouth daily    UA (URINE) with reflex to Scope; Future    Magnesium; Future    TSH, 3rd generation; Future    T4, free; Future    CBC and differential; Future    Comprehensive metabolic panel; Future    Lipid panel; Future    NM myocardial perfusion spect (rx stress and/or rest); Future    Irritable bowel syndrome with constipation         Interstitial lung disease (HCC)         Elevated hemoglobin A1c    Orders:    POCT hemoglobin A1c    Quantiferon TB Gold Plus Assay; Future    H. pylori antigen, stool; Future    UA (URINE) with reflex to Scope; Future    Magnesium; Future    TSH, 3rd generation; Future    T4, free; Future    CBC and differential; Future    Comprehensive metabolic panel; Future    Lipid panel; Future    NM myocardial perfusion spect (rx stress and/or rest); Future    Idiopathic osteoporosis    Orders:    DXA bone density spine hip and pelvis; Future    Quantiferon TB Gold Plus Assay; Future    H. pylori antigen, stool; Future    SOB (shortness of breath)    Orders:    Quantiferon TB Gold Plus Assay; Future    H. pylori antigen, stool; Future    UA (URINE) with  reflex to Scope; Future    Magnesium; Future    TSH, 3rd generation; Future    T4, free; Future    CBC and differential; Future    Comprehensive metabolic panel; Future    Lipid panel; Future    NM myocardial perfusion spect (rx stress and/or rest); Future    Lumbar radiculopathy    Orders:    NM myocardial perfusion spect (rx stress and/or rest); Future    Blurred vision, bilateral    Orders:    Ambulatory Referral to Ophthalmology; Future    Spinal stenosis of lumbar region with neurogenic claudication  FU w pain mangt  FU w P.T.  Continue Gabapentin...  FU w Neurosurgery  Life style Mod  RTC in 3 mos w Blood work              History of Present Illness   64 Y O Lady is here for Regular check Up, she has few symptoms, recent Blood work and med list reviewed,...      Review of Systems   Constitutional:  Negative for chills, fatigue and fever.   HENT:  Positive for postnasal drip. Negative for congestion, ear pain, facial swelling, sore throat, trouble swallowing and voice change.    Eyes:  Negative for pain, discharge and visual disturbance.   Respiratory:  Negative for cough, shortness of breath and wheezing.    Cardiovascular:  Negative for chest pain, palpitations and leg swelling.   Gastrointestinal:  Negative for abdominal pain, blood in stool, constipation, diarrhea, nausea and vomiting.   Endocrine: Negative for polydipsia, polyphagia and polyuria.   Genitourinary:  Negative for difficulty urinating, dysuria, hematuria and urgency.   Musculoskeletal:  Positive for back pain. Negative for arthralgias and myalgias.   Skin:  Negative for color change and rash.   Neurological:  Positive for numbness. Negative for dizziness, tremors, seizures, syncope, weakness and headaches.   Hematological:  Negative for adenopathy. Does not bruise/bleed easily.   Psychiatric/Behavioral:  Negative for dysphoric mood, sleep disturbance and suicidal ideas.    All other systems reviewed and are negative.      Objective   /82  "(BP Location: Left arm, Patient Position: Sitting, Cuff Size: Standard)   Pulse 59   Temp 97.5 °F (36.4 °C) (Tympanic Core)   Resp 15   Ht 4' 11\" (1.499 m)   Wt 74.8 kg (165 lb)   SpO2 97%   BMI 33.33 kg/m²      Physical Exam  Vitals and nursing note reviewed.   Constitutional:       General: She is not in acute distress.     Appearance: She is well-developed. She is not diaphoretic.   HENT:      Head: Normocephalic and atraumatic.      Right Ear: External ear normal.      Left Ear: External ear normal.      Nose: Nose normal.   Eyes:      General:         Right eye: No discharge.         Left eye: No discharge.      Conjunctiva/sclera: Conjunctivae normal.      Pupils: Pupils are equal, round, and reactive to light.   Neck:      Thyroid: No thyromegaly.      Trachea: No tracheal deviation.   Cardiovascular:      Rate and Rhythm: Normal rate and regular rhythm.      Heart sounds: Murmur heard.      No friction rub.   Pulmonary:      Effort: Pulmonary effort is normal. No respiratory distress.      Breath sounds: Normal breath sounds. No stridor. No wheezing or rales.   Abdominal:      General: Bowel sounds are normal. There is no distension.      Palpations: Abdomen is soft.      Tenderness: There is no abdominal tenderness. There is no guarding.   Musculoskeletal:         General: Tenderness present. No swelling or deformity. Normal range of motion.      Cervical back: Normal range of motion and neck supple.   Lymphadenopathy:      Cervical: No cervical adenopathy.   Skin:     General: Skin is warm and dry.      Capillary Refill: Capillary refill takes less than 2 seconds.      Coloration: Skin is not pale.      Findings: No erythema or rash.   Neurological:      Mental Status: She is alert and oriented to person, place, and time.      Cranial Nerves: No cranial nerve deficit.      Sensory: Sensory deficit present.      Coordination: Coordination normal.   Psychiatric:         Mood and Affect: Mood normal.   "       Behavior: Behavior normal.

## 2025-03-28 ENCOUNTER — OFFICE VISIT (OUTPATIENT)
Dept: PAIN MEDICINE | Facility: CLINIC | Age: 65
End: 2025-03-28
Payer: COMMERCIAL

## 2025-03-28 VITALS — WEIGHT: 165 LBS | BODY MASS INDEX: 33.26 KG/M2 | HEIGHT: 59 IN

## 2025-03-28 DIAGNOSIS — M54.16 LUMBAR RADICULITIS: Primary | ICD-10-CM

## 2025-03-28 DIAGNOSIS — M47.816 LUMBAR SPONDYLOSIS: ICD-10-CM

## 2025-03-28 PROCEDURE — 99214 OFFICE O/P EST MOD 30 MIN: CPT | Performed by: ANESTHESIOLOGY

## 2025-03-28 NOTE — PROGRESS NOTES
Assessment:  1. Lumbar radiculitis    2. Lumbar spondylosis      Patient presents with daughter today who provided Welsh interpretation.     Patient presenting for follow-up visit.  She has a history of chronic back with right radicular leg pain for greater than 1 year.    Pain is consistent with lumbar radicular pain, lumbar spinal stenosis accompanied by pain at times  >7/10 on the pain scale with inability to participate in IADLs for >6 weeks.      Patient has participated with chiropractic therapy [place]. She states she is fearful of trying chiropractic or physical therapy again as it worsened her symptoms.      Patient has tried gabapentin with modest benefit.       Independently reviewed and interpreted external lumbar MRI. This showed mild to moderate spinal stenosis from L3-S1 with vary degrees of foraminal stenosis.    L4-5 LESI most recently repeated on 1/9/25 with about 3 months of 50% pain relief.     Plan:     Pain has returned to a significant degree with R>L radicular distribution. Discussed and offered repeat L4-5 LESI.    The procedures, its risks, and benefits were explained in detail to the patient. Risks include but are not limited to bleeding, infection, hematoma formation, abscess formation, weakness, headache, failure the pain to improve, nerve irritation or damage, and potential worsening of the pain.  The approach was demonstrated using models and literature was provided. The patient verbalized understanding and wished to proceed with the procedure.     Reviewed Neurosurgery office note.     Reviewed hemoglobin A1c, renal function, CBC and/or PT/INR prior to discussing/offering interventional modalities.    My impressions and treatment recommendations were discussed in detail with the patient who verbalized understanding and had no further questions.  Discharge instructions were provided. I personally saw and examined the patient and I agree with the above discussed plan of  care.    History of Present Illness:  Young Quiñonez is a 64 y.o. female who presents for a follow up office visit in regards to Back Pain.   The patient’s current symptoms include recurrent significant back and right radiating leg pain symptoms. Pain is rated 7/10 on the pain scale currently.  Pain symptoms are described as an intermittent cramping, pressure-like pain worse at night.    I have personally reviewed and/or updated the patient's past medical history, past surgical history, family history, social history, current medications, allergies, and vital signs today.     Review of Systems   Constitutional:  Negative for chills and fever.   HENT:  Negative for ear pain and sore throat.    Eyes:  Negative for pain and visual disturbance.   Respiratory:  Negative for cough and shortness of breath.    Cardiovascular:  Negative for chest pain and palpitations.   Gastrointestinal:  Negative for abdominal pain and vomiting.   Genitourinary:  Negative for dysuria and hematuria.   Musculoskeletal:  Positive for back pain, gait problem and myalgias. Negative for arthralgias.   Skin:  Negative for color change and rash.   Neurological:  Positive for weakness. Negative for seizures and syncope.   All other systems reviewed and are negative.      Patient Active Problem List   Diagnosis    Lumbar radiculopathy    Interstitial lung disease (HCC)       Past Medical History:   Diagnosis Date    Constipated     Hyperlipidemia     Hypertension     Interstitial lung disease (HCC)        Past Surgical History:   Procedure Laterality Date    CATARACT EXTRACTION Right     COLONOSCOPY      ECTOPIC PREGNANCY SURGERY  1994    TUBAL LIGATION         Family History   Problem Relation Age of Onset    Hypertension Mother     Hypertension Father     Cancer Brother         lung    Lung cancer Brother     Breast cancer Neg Hx     Colon cancer Neg Hx     Ovarian cancer Neg Hx        Social History     Occupational History    Not on file  "  Tobacco Use    Smoking status: Former     Current packs/day: 1.00     Average packs/day: 1 pack/day for 13.2 years (13.2 ttl pk-yrs)     Types: Cigarettes     Start date: 2012    Smokeless tobacco: Never   Vaping Use    Vaping status: Never Used   Substance and Sexual Activity    Alcohol use: Yes     Comment: couple times/year    Drug use: Never    Sexual activity: Not Currently     Partners: Male     Birth control/protection: Post-menopausal       Current Outpatient Medications on File Prior to Visit   Medication Sig    aspirin 81 mg chewable tablet Chew 81 mg daily    bisoprolol (ZEBETA) 5 mg tablet Take 1 tablet (5 mg total) by mouth daily    ergocalciferol (VITAMIN D2) 50,000 units Take 1 capsule (50,000 Units total) by mouth once a week    gabapentin (NEURONTIN) 100 mg capsule AS DIRECTED    lubiprostone (AMITIZA) 8 mcg capsule Take 1 capsule (8 mcg total) by mouth 2 (two) times a day with meals    rosuvastatin (CRESTOR) 5 mg tablet Take 1 tablet (5 mg total) by mouth daily    triamcinolone (KENALOG) 0.1 % cream APPLY TOPICALLY 2 (TWO) TIMES A DAY FOR UP TO TWO WEEKS WITH A ONE WEEK BREAK IN BETWEEN TRETAMENTS    ciclopirox (LOPROX) 0.77 % cream Apply topically 2 (two) times a day Apply to affected areas on feet. (Patient not taking: Reported on 2/24/2025)    fluticasone (FLONASE) 50 mcg/act nasal spray 2 sprays into each nostril daily (Patient not taking: Reported on 2/24/2025)     No current facility-administered medications on file prior to visit.       No Known Allergies    Physical Exam:    Ht 4' 11\" (1.499 m)   Wt 74.8 kg (165 lb)   BMI 33.33 kg/m²     Constitutional:normal, well developed, well nourished, alert, in no distress and non-toxic and no overt pain behavior.  Eyes:anicteric  HEENT:grossly intact  Neck:supple, symmetric, trachea midline and no masses   Pulmonary:even and unlabored  Cardiovascular:No edema or pitting edema present  Skin:Normal without rashes or lesions and well " hydrated  Psychiatric:Mood and affect appropriate  Neurologic: Motor function is grossly intact with no focal neurologic deficits   Musculoskeletal: Gait is nonantalgic.    Imaging

## 2025-04-08 ENCOUNTER — TELEPHONE (OUTPATIENT)
Age: 65
End: 2025-04-08

## 2025-04-08 NOTE — TELEPHONE ENCOUNTER
Caller: Daughter     Doctor/Office: Dr Hamlin     Call regarding :  Calling to reschedule procedure due to patient being on antibiotics from tooth extraction.   Call was transferred to: Procedure

## 2025-04-09 DIAGNOSIS — L30.9 HAND DERMATITIS: ICD-10-CM

## 2025-04-09 RX ORDER — TRIAMCINOLONE ACETONIDE 1 MG/G
CREAM TOPICAL 2 TIMES DAILY
Qty: 30 G | Refills: 0 | Status: SHIPPED | OUTPATIENT
Start: 2025-04-09

## 2025-05-08 ENCOUNTER — HOSPITAL ENCOUNTER (OUTPATIENT)
Dept: RADIOLOGY | Facility: MEDICAL CENTER | Age: 65
End: 2025-05-08
Attending: ANESTHESIOLOGY
Payer: COMMERCIAL

## 2025-05-08 ENCOUNTER — TELEPHONE (OUTPATIENT)
Age: 65
End: 2025-05-08

## 2025-05-08 VITALS
SYSTOLIC BLOOD PRESSURE: 124 MMHG | OXYGEN SATURATION: 96 % | HEART RATE: 55 BPM | RESPIRATION RATE: 18 BRPM | TEMPERATURE: 97.6 F | DIASTOLIC BLOOD PRESSURE: 75 MMHG

## 2025-05-08 DIAGNOSIS — M54.16 LUMBAR RADICULOPATHY: ICD-10-CM

## 2025-05-08 PROCEDURE — 62323 NJX INTERLAMINAR LMBR/SAC: CPT | Performed by: ANESTHESIOLOGY

## 2025-05-08 RX ORDER — BUPIVACAINE HCL/PF 2.5 MG/ML
1 VIAL (ML) INJECTION ONCE
Status: COMPLETED | OUTPATIENT
Start: 2025-05-08 | End: 2025-05-08

## 2025-05-08 RX ORDER — METHYLPREDNISOLONE ACETATE 80 MG/ML
80 INJECTION, SUSPENSION INTRA-ARTICULAR; INTRALESIONAL; INTRAMUSCULAR; PARENTERAL; SOFT TISSUE ONCE
Status: COMPLETED | OUTPATIENT
Start: 2025-05-08 | End: 2025-05-08

## 2025-05-08 RX ADMIN — METHYLPREDNISOLONE ACETATE 80 MG: 80 INJECTION, SUSPENSION INTRA-ARTICULAR; INTRALESIONAL; INTRAMUSCULAR; SOFT TISSUE at 10:47

## 2025-05-08 RX ADMIN — BUPIVACAINE HYDROCHLORIDE 1 ML: 2.5 INJECTION, SOLUTION EPIDURAL; INFILTRATION; INTRACAUDAL at 10:47

## 2025-05-08 RX ADMIN — IOHEXOL 1 ML: 300 INJECTION, SOLUTION INTRAVENOUS at 10:47

## 2025-05-08 NOTE — H&P
History of Present Illness: The patient is a 64 y.o. female who presents with complaints of back pain    Past Medical History:   Diagnosis Date    Constipated     Hyperlipidemia     Hypertension     Interstitial lung disease (HCC)        Past Surgical History:   Procedure Laterality Date    CATARACT EXTRACTION Right     COLONOSCOPY      ECTOPIC PREGNANCY SURGERY  1994    TUBAL LIGATION           Current Outpatient Medications:     triamcinolone (KENALOG) 0.1 % cream, APPLY TOPICALLY 2 (TWO) TIMES A DAY FOR UP TO TWO WEEKS WITH A ONE WEEK BREAK IN BETWEEN TRETAMENTS, Disp: 30 g, Rfl: 0    aspirin 81 mg chewable tablet, Chew 81 mg daily, Disp: , Rfl:     bisoprolol (ZEBETA) 5 mg tablet, Take 1 tablet (5 mg total) by mouth daily, Disp: 90 tablet, Rfl: 3    ciclopirox (LOPROX) 0.77 % cream, Apply topically 2 (two) times a day Apply to affected areas on feet. (Patient not taking: Reported on 2/24/2025), Disp: 90 g, Rfl: 6    ergocalciferol (VITAMIN D2) 50,000 units, Take 1 capsule (50,000 Units total) by mouth once a week, Disp: 12 capsule, Rfl: 2    fluticasone (FLONASE) 50 mcg/act nasal spray, 2 sprays into each nostril daily (Patient not taking: Reported on 2/24/2025), Disp: 16 g, Rfl: 5    gabapentin (NEURONTIN) 100 mg capsule, AS DIRECTED, Disp: 60 capsule, Rfl: 5    lubiprostone (AMITIZA) 8 mcg capsule, Take 1 capsule (8 mcg total) by mouth 2 (two) times a day with meals, Disp: 60 capsule, Rfl: 2    rosuvastatin (CRESTOR) 5 mg tablet, Take 1 tablet (5 mg total) by mouth daily, Disp: 90 tablet, Rfl: 3    Current Facility-Administered Medications:     bupivacaine (PF) (MARCAINE) 0.25 % injection 1 mL, 1 mL, Epidural, Once, Will Berry Hamlin MD    iohexol (OMNIPAQUE) 300 mg/mL injection 1 mL, 1 mL, Epidural, Once, Will Berry Hamlin MD    methylPREDNISolone acetate (DEPO-MEDROL) injection 80 mg, 80 mg, Epidural, Once, Will Berry Hamlin MD    No Known Allergies    Physical Exam:   Vitals:    05/08/25 1034   BP: 108/67    Pulse: (!) 53   Resp: 18   Temp: 97.6 °F (36.4 °C)   SpO2: 97%         General: Awake, Alert, Oriented x 3, Mood and affect appropriate  Respiratory: Respirations even and unlabored  Cardiovascular: Peripheral pulses intact; no edema  Musculoskeletal Exam: back pain    ASA Score: 1    Patient/Chart Verification  Patient ID Verified: Verbal  ID Band Applied: No  Consents Confirmed: To be obtained in the Procedural area  Interval H&P(within 24 hr) Complete (required for Outpatients and Surgery Admit only): To be obtained in the Procedural area  Allergies Reviewed: Yes  Anticoag/NSAID held?: NA  Currently on antibiotics?: No  Pregnancy denied?: NA    Assessment:   1. Lumbar radiculopathy        Plan: L4-5 MATTHEW

## 2025-05-08 NOTE — DISCHARGE INSTR - LAB
Epidural Steroid Injection   WHAT YOU NEED TO KNOW:   An epidural steroid injection (PAT) is a procedure to inject steroid medicine into the epidural space. The epidural space is between your spinal cord and vertebrae. Steroids reduce inflammation and fluid buildup in your spine that may be causing pain. You may be given pain medicine along with the steroids.          ACTIVITY  Do not drive or operate machinery today.  No strenuous activity today - bending, lifting, etc.  You may resume normal activites starting tomorrow - start slowly and as tolerated.  You may shower today, but no tub baths or hot tubs.  You may have numbness for several hours from the local anesthetic. Please use caution and common sense, especially with weight-bearing activities.    CARE OF THE INJECTION SITE  If you have soreness or pain, apply ice to the area today (20 minutes on/20 minutes off).  Starting tomorrow, you may use warm, moist heat or ice if needed.  You may have an increase or change in your discomfort for 36-48 hours after your treatment.  Apply ice and continue with any pain medication you have been prescribed.  Notify the [facility] if you have any of the following: redness, drainage, swelling, headache, stiff neck or fever above 100°F.    SPECIAL INSTRUCTIONS  Our office will contact you in approximately 14 days for a progress report.    MEDICATIONS  Continue to take all routine medications.  Our office may have instructed you to hold some medications.    As no general anesthesia was used in today's procedure, you should not experience any side effects related to anesthesia.     If you are diabetic, the steroids used in today's injection may temporarily increase your blood sugar levels after the first few days after your injection. Please keep a close eye on your sugars and alert the doctor who manages your diabetes if your sugars are significantly high from your baseline or you are symptomatic.     If you have a  problem specifically related to your procedure, please call our office at [phone].  Problems not related to your procedure should be directed to your primary care physician.

## 2025-05-08 NOTE — TELEPHONE ENCOUNTER
Patient's daughter calling to request more samples of Linzess. States patient finished last bottle approximately 1 week ago and has been out. Please advise and call daughter Amal.

## 2025-05-13 DIAGNOSIS — L30.9 HAND DERMATITIS: ICD-10-CM

## 2025-05-13 RX ORDER — TRIAMCINOLONE ACETONIDE 1 MG/G
CREAM TOPICAL 2 TIMES DAILY
Qty: 30 G | Refills: 0 | Status: SHIPPED | OUTPATIENT
Start: 2025-05-13

## 2025-05-22 ENCOUNTER — TELEPHONE (OUTPATIENT)
Dept: PAIN MEDICINE | Facility: CLINIC | Age: 65
End: 2025-05-22

## 2025-05-23 ENCOUNTER — HOSPITAL ENCOUNTER (OUTPATIENT)
Dept: MAMMOGRAPHY | Facility: MEDICAL CENTER | Age: 65
Discharge: HOME | End: 2025-05-23
Payer: COMMERCIAL

## 2025-05-23 VITALS — BODY MASS INDEX: 33.26 KG/M2 | HEIGHT: 59 IN | WEIGHT: 165 LBS

## 2025-05-23 DIAGNOSIS — Z12.31 ENCOUNTER FOR SCREENING MAMMOGRAM FOR MALIGNANT NEOPLASM OF BREAST: ICD-10-CM

## 2025-05-23 PROCEDURE — 77067 SCR MAMMO BI INCL CAD: CPT

## 2025-05-23 PROCEDURE — 77063 BREAST TOMOSYNTHESIS BI: CPT

## 2025-06-09 DIAGNOSIS — L30.9 HAND DERMATITIS: ICD-10-CM

## 2025-06-11 NOTE — TELEPHONE ENCOUNTER
Pt was seen 02/06/25    Try to not wash your hands as often unless needed  Wear gloves if doing the dishes  Wear a thick cream like CeraVe, Cetaphil or Eucerin after washing your hands multiple times a day   Start Triamcinolone 0.1% cream when your hands get bad twice a day for two weeks with a one week

## 2025-06-12 RX ORDER — TRIAMCINOLONE ACETONIDE 1 MG/G
CREAM TOPICAL 2 TIMES DAILY
Qty: 30 G | Refills: 1 | Status: SHIPPED | OUTPATIENT
Start: 2025-06-12

## 2025-06-27 ENCOUNTER — TELEPHONE (OUTPATIENT)
Age: 65
End: 2025-06-27

## 2025-06-27 NOTE — TELEPHONE ENCOUNTER
Patient daughter called in to get stool softer tablets sample for her mom. She mentioned Dr. Sosa informed her to call when she needs it. Please do help. Thanks

## 2025-06-30 NOTE — TELEPHONE ENCOUNTER
----- Message from Parveen Sosa MD sent at 5/15/2024  7:29 AM EDT -----  Pain Mangt ASAP  ----- Message -----  From: Interface, Radiology Results In  Sent: 5/13/2024   7:18 PM EDT  To: Parveen Sosa MD   DISCHARGE

## 2025-07-01 ENCOUNTER — OFFICE VISIT (OUTPATIENT)
Dept: FAMILY MEDICINE CLINIC | Facility: CLINIC | Age: 65
End: 2025-07-01
Payer: COMMERCIAL

## 2025-07-01 ENCOUNTER — TELEPHONE (OUTPATIENT)
Dept: FAMILY MEDICINE CLINIC | Facility: CLINIC | Age: 65
End: 2025-07-01

## 2025-07-01 VITALS
RESPIRATION RATE: 15 BRPM | WEIGHT: 161.4 LBS | DIASTOLIC BLOOD PRESSURE: 68 MMHG | BODY MASS INDEX: 32.54 KG/M2 | TEMPERATURE: 97.6 F | HEIGHT: 59 IN | SYSTOLIC BLOOD PRESSURE: 122 MMHG | HEART RATE: 57 BPM | OXYGEN SATURATION: 96 %

## 2025-07-01 DIAGNOSIS — M54.16 LUMBAR RADICULOPATHY: ICD-10-CM

## 2025-07-01 DIAGNOSIS — E55.9 VITAMIN D DEFICIENCY: ICD-10-CM

## 2025-07-01 DIAGNOSIS — M81.8 IDIOPATHIC OSTEOPOROSIS: Primary | ICD-10-CM

## 2025-07-01 DIAGNOSIS — K58.1 IRRITABLE BOWEL SYNDROME WITH CONSTIPATION: ICD-10-CM

## 2025-07-01 DIAGNOSIS — E78.49 OTHER HYPERLIPIDEMIA: ICD-10-CM

## 2025-07-01 DIAGNOSIS — I10 PRIMARY HYPERTENSION: ICD-10-CM

## 2025-07-01 DIAGNOSIS — J84.9 INTERSTITIAL LUNG DISEASE (HCC): ICD-10-CM

## 2025-07-01 DIAGNOSIS — F17.210 CIGARETTE SMOKER: ICD-10-CM

## 2025-07-01 PROCEDURE — 99214 OFFICE O/P EST MOD 30 MIN: CPT | Performed by: INTERNAL MEDICINE

## 2025-07-01 RX ORDER — ERGOCALCIFEROL 1.25 MG/1
50000 CAPSULE, LIQUID FILLED ORAL WEEKLY
Qty: 12 CAPSULE | Refills: 2 | Status: SHIPPED | OUTPATIENT
Start: 2025-07-01

## 2025-07-01 RX ORDER — ASPIRIN 81 MG/1
81 TABLET ORAL DAILY
Qty: 90 TABLET | Refills: 3 | Status: SHIPPED | OUTPATIENT
Start: 2025-07-01

## 2025-07-01 RX ORDER — BISOPROLOL FUMARATE 5 MG/1
5 TABLET, FILM COATED ORAL DAILY
Qty: 90 TABLET | Refills: 3 | Status: SHIPPED | OUTPATIENT
Start: 2025-07-01

## 2025-07-01 RX ORDER — ROSUVASTATIN CALCIUM 5 MG/1
5 TABLET, COATED ORAL DAILY
Qty: 90 TABLET | Refills: 3 | Status: SHIPPED | OUTPATIENT
Start: 2025-07-01

## 2025-07-01 NOTE — PROGRESS NOTES
Name: Young Quiñonez      : 1960      MRN: [id number]  Encounter Provider: Parveen Sosa MD  Encounter Date: 2025   Encounter department: [facility] CARE [facility]  :  Assessment & Plan  Other hyperlipidemia    Orders:    rosuvastatin (CRESTOR) 5 mg tablet; Take 1 tablet (5 mg total) by mouth daily    UA (URINE) with reflex to Scope; Future    Quantiferon TB Gold Plus Assay; Future    H. pylori antigen, stool; Future    Magnesium; Future    CBC and differential    TSH, 3rd generation; Future    T4, free; Future    Comprehensive metabolic panel    Lipid panel    aspirin (Aspirin 81) 81 mg EC tablet; Take 1 tablet (81 mg total) by mouth daily    Vitamin D deficiency    Orders:    ergocalciferol (VITAMIN D2) 50,000 units; Take 1 capsule (50,000 Units total) by mouth once a week    Primary hypertension    Orders:    bisoprolol (ZEBETA) 5 mg tablet; Take 1 tablet (5 mg total) by mouth daily    UA (URINE) with reflex to Scope; Future    Quantiferon TB Gold Plus Assay; Future    H. pylori antigen, stool; Future    Magnesium; Future    CBC and differential    TSH, 3rd generation; Future    T4, free; Future    Comprehensive metabolic panel    Lipid panel    aspirin (Aspirin 81) 81 mg EC tablet; Take 1 tablet (81 mg total) by mouth daily    Idiopathic osteoporosis    Orders:    DXA bone density spine hip and pelvis; Future    Cigarette smoker  Pt quit smoking last year  Orders:    CT Lung Screening Program; Future    aspirin (Aspirin 81) 81 mg EC tablet; Take 1 tablet (81 mg total) by mouth daily    diclofenac sodium (VOLTAREN) 50 mg EC tablet; Take 1 tablet (50 mg total) by mouth 2 (two) times daily after meals    Lumbar radiculopathy  FU w pain Mangt  Orders:    Ambulatory Referral to Neurosurgery; Future    diclofenac sodium (VOLTAREN) 50 mg EC tablet; Take 1 tablet (50 mg total) by mouth 2 (two) times daily after meals    Irritable bowel syndrome with constipation  Increase;  Orders:     "linaCLOtide 290 MCG CAPS; Take 1 capsule by mouth daily    Interstitial lung disease (HCC)  Pt quit smoking last year  FU w Pulmonary  RTC in 3 mos w  Ct Lungs                History of Present Illness   64 Y O Lady is here for Regular check up, she has few symptoms, recent blood work and med list reviewed,...      Review of Systems   Constitutional:  Negative for chills, fatigue and fever.   HENT:  Negative for congestion, ear pain, facial swelling, sore throat, trouble swallowing and voice change.    Eyes:  Negative for pain, discharge and visual disturbance.   Respiratory:  Negative for cough, shortness of breath and wheezing.    Cardiovascular:  Negative for chest pain, palpitations and leg swelling.   Gastrointestinal:  Positive for constipation. Negative for abdominal pain, blood in stool, diarrhea, nausea and vomiting.   Endocrine: Negative for polydipsia, polyphagia and polyuria.   Genitourinary:  Negative for difficulty urinating, dysuria, hematuria and urgency.   Musculoskeletal:  Positive for back pain. Negative for arthralgias and myalgias.   Skin:  Negative for color change and rash.   Neurological:  Positive for numbness. Negative for dizziness, tremors, seizures, syncope, weakness and headaches.   Hematological:  Negative for adenopathy. Does not bruise/bleed easily.   Psychiatric/Behavioral:  Negative for dysphoric mood, sleep disturbance and suicidal ideas.    All other systems reviewed and are negative.      Objective   /68 (BP Location: Left arm, Patient Position: Sitting, Cuff Size: Standard)   Pulse 57   Temp 97.6 °F (36.4 °C) (Temporal)   Resp 15   Ht 4' 11\" (1.499 m)   Wt 73.2 kg (161 lb 6.4 oz)   SpO2 96%   BMI 32.60 kg/m²      Physical Exam  Vitals and nursing note reviewed.   Constitutional:       General: She is not in acute distress.     Appearance: She is well-developed. She is not diaphoretic.   HENT:      Head: Normocephalic and atraumatic.      Right Ear: External ear " normal.      Left Ear: External ear normal.      Nose: Nose normal.     Eyes:      General:         Right eye: No discharge.         Left eye: No discharge.      Conjunctiva/sclera: Conjunctivae normal.      Pupils: Pupils are equal, round, and reactive to light.     Neck:      Thyroid: No thyromegaly.      Trachea: No tracheal deviation.     Cardiovascular:      Rate and Rhythm: Normal rate and regular rhythm.      Heart sounds: Normal heart sounds. No murmur heard.     No friction rub.   Pulmonary:      Effort: Pulmonary effort is normal. No respiratory distress.      Breath sounds: Normal breath sounds. No stridor. No wheezing or rales.   Abdominal:      General: Bowel sounds are normal. There is no distension.      Palpations: Abdomen is soft.      Tenderness: There is abdominal tenderness. There is no guarding.     Musculoskeletal:         General: Tenderness present. No swelling or deformity. Normal range of motion.      Cervical back: Normal range of motion and neck supple.   Lymphadenopathy:      Cervical: No cervical adenopathy.     Skin:     General: Skin is warm and dry.      Capillary Refill: Capillary refill takes less than 2 seconds.      Coloration: Skin is not pale.      Findings: No erythema or rash.     Neurological:      Mental Status: She is alert and oriented to person, place, and time.      Cranial Nerves: No cranial nerve deficit.      Sensory: Sensory deficit present.      Coordination: Coordination normal.     Psychiatric:         Mood and Affect: Mood normal.         Behavior: Behavior normal.

## 2025-07-01 NOTE — PATIENT INSTRUCTIONS

## 2025-07-01 NOTE — TELEPHONE ENCOUNTER
PA for inaCLOtide 290 MCG CAPS SUBMITTED to     via    []CMM-KEY:   [x]Surescripts-Case ID #   []Availity-Auth ID # NDC #   []Faxed to plan   []Other website   []Phone call Case ID #     [x]PA sent as URGENT    All office notes, labs and other pertaining documents and studies sent. Clinical questions answered. Awaiting determination from insurance company.     Turnaround time for your insurance to make a decision on your Prior Authorization can take 7-21 business days.

## 2025-07-01 NOTE — TELEPHONE ENCOUNTER
Unable to complete prior authorization. Patient last seen in office 7/1. Please follow up with the patient to obtain pharmacy benefits.    RX GROUP, RX BIN, RX PCN, RX ID, NAME.     When completed please send back to the prior authorization team.     Thank you

## 2025-07-01 NOTE — ASSESSMENT & PLAN NOTE
ANIYA kiser pain Mangt  Orders:    Ambulatory Referral to Neurosurgery; Future    diclofenac sodium (VOLTAREN) 50 mg EC tablet; Take 1 tablet (50 mg total) by mouth 2 (two) times daily after meals

## 2025-07-14 PROBLEM — M48.062 SPINAL STENOSIS OF LUMBAR REGION WITH NEUROGENIC CLAUDICATION: Status: ACTIVE | Noted: 2025-07-14

## 2025-07-15 ENCOUNTER — OFFICE VISIT (OUTPATIENT)
Dept: NEUROSURGERY | Facility: CLINIC | Age: 65
End: 2025-07-15
Attending: INTERNAL MEDICINE
Payer: COMMERCIAL

## 2025-07-15 VITALS
DIASTOLIC BLOOD PRESSURE: 72 MMHG | SYSTOLIC BLOOD PRESSURE: 116 MMHG | WEIGHT: 159.4 LBS | BODY MASS INDEX: 32.13 KG/M2 | HEIGHT: 59 IN | RESPIRATION RATE: 17 BRPM | OXYGEN SATURATION: 99 % | TEMPERATURE: 97.3 F | HEART RATE: 50 BPM

## 2025-07-15 DIAGNOSIS — M54.16 LUMBAR RADICULOPATHY: ICD-10-CM

## 2025-07-15 DIAGNOSIS — M48.062 SPINAL STENOSIS OF LUMBAR REGION WITH NEUROGENIC CLAUDICATION: Primary | ICD-10-CM

## 2025-07-15 PROCEDURE — 99215 OFFICE O/P EST HI 40 MIN: CPT | Performed by: NURSE PRACTITIONER

## 2025-07-23 ENCOUNTER — HOSPITAL ENCOUNTER (OUTPATIENT)
Dept: BONE DENSITY | Facility: CLINIC | Age: 65
Discharge: HOME | End: 2025-07-23
Attending: INTERNAL MEDICINE
Payer: COMMERCIAL

## 2025-07-23 VITALS — BODY MASS INDEX: 33.37 KG/M2 | WEIGHT: 159 LBS | HEIGHT: 58 IN

## 2025-07-23 DIAGNOSIS — M81.8 IDIOPATHIC OSTEOPOROSIS: ICD-10-CM

## 2025-07-23 PROCEDURE — 77080 DXA BONE DENSITY AXIAL: CPT

## 2025-08-04 ENCOUNTER — TELEPHONE (OUTPATIENT)
Age: 65
End: 2025-08-04

## 2025-08-07 ENCOUNTER — HOSPITAL ENCOUNTER (OUTPATIENT)
Dept: RADIOLOGY | Facility: MEDICAL CENTER | Age: 65
Discharge: HOME | End: 2025-08-07
Attending: ANESTHESIOLOGY
Payer: COMMERCIAL

## 2025-08-08 ENCOUNTER — TELEPHONE (OUTPATIENT)
Age: 65
End: 2025-08-08

## 2025-08-17 ENCOUNTER — TELEPHONE (OUTPATIENT)
Dept: OTHER | Facility: OTHER | Age: 65
End: 2025-08-17

## 2025-08-21 ENCOUNTER — TELEPHONE (OUTPATIENT)
Dept: PAIN MEDICINE | Facility: CLINIC | Age: 65
End: 2025-08-21